# Patient Record
Sex: FEMALE | URBAN - METROPOLITAN AREA
[De-identification: names, ages, dates, MRNs, and addresses within clinical notes are randomized per-mention and may not be internally consistent; named-entity substitution may affect disease eponyms.]

---

## 2018-02-28 ENCOUNTER — INPATIENT (INPATIENT)
Facility: HOSPITAL | Age: 48
LOS: 2 days | Discharge: HOME HEALTH | End: 2018-03-03
Attending: INTERNAL MEDICINE | Admitting: INTERNAL MEDICINE
Payer: MEDICAID

## 2018-02-28 VITALS
HEART RATE: 110 BPM | SYSTOLIC BLOOD PRESSURE: 132 MMHG | TEMPERATURE: 98 F | RESPIRATION RATE: 18 BRPM | DIASTOLIC BLOOD PRESSURE: 92 MMHG | OXYGEN SATURATION: 100 %

## 2018-02-28 DIAGNOSIS — E11.9 TYPE 2 DIABETES MELLITUS WITHOUT COMPLICATIONS: ICD-10-CM

## 2018-02-28 DIAGNOSIS — I47.2 VENTRICULAR TACHYCARDIA: ICD-10-CM

## 2018-02-28 DIAGNOSIS — I10 ESSENTIAL (PRIMARY) HYPERTENSION: ICD-10-CM

## 2018-02-28 DIAGNOSIS — Z29.9 ENCOUNTER FOR PROPHYLACTIC MEASURES, UNSPECIFIED: ICD-10-CM

## 2018-02-28 LAB
ALBUMIN SERPL ELPH-MCNC: 3.3 G/DL — SIGNIFICANT CHANGE UP (ref 3.3–5)
ALBUMIN SERPL ELPH-MCNC: 3.7 G/DL — SIGNIFICANT CHANGE UP (ref 3.3–5)
ALP SERPL-CCNC: 56 U/L — SIGNIFICANT CHANGE UP (ref 40–120)
ALP SERPL-CCNC: 62 U/L — SIGNIFICANT CHANGE UP (ref 40–120)
ALT FLD-CCNC: 11 U/L — SIGNIFICANT CHANGE UP (ref 4–33)
ALT FLD-CCNC: 8 U/L — SIGNIFICANT CHANGE UP (ref 4–33)
APTT BLD: 28.7 SEC — SIGNIFICANT CHANGE UP (ref 27.5–37.4)
AST SERPL-CCNC: 14 U/L — SIGNIFICANT CHANGE UP (ref 4–32)
AST SERPL-CCNC: 17 U/L — SIGNIFICANT CHANGE UP (ref 4–32)
BASOPHILS # BLD AUTO: 0.03 K/UL — SIGNIFICANT CHANGE UP (ref 0–0.2)
BASOPHILS NFR BLD AUTO: 0.6 % — SIGNIFICANT CHANGE UP (ref 0–2)
BILIRUB SERPL-MCNC: 0.2 MG/DL — SIGNIFICANT CHANGE UP (ref 0.2–1.2)
BILIRUB SERPL-MCNC: < 0.2 MG/DL — LOW (ref 0.2–1.2)
BUN SERPL-MCNC: 10 MG/DL — SIGNIFICANT CHANGE UP (ref 7–23)
BUN SERPL-MCNC: 12 MG/DL — SIGNIFICANT CHANGE UP (ref 7–23)
CALCIUM SERPL-MCNC: 8.1 MG/DL — LOW (ref 8.4–10.5)
CALCIUM SERPL-MCNC: 8.7 MG/DL — SIGNIFICANT CHANGE UP (ref 8.4–10.5)
CHLORIDE SERPL-SCNC: 103 MMOL/L — SIGNIFICANT CHANGE UP (ref 98–107)
CHLORIDE SERPL-SCNC: 105 MMOL/L — SIGNIFICANT CHANGE UP (ref 98–107)
CK MB BLD-MCNC: 1.04 NG/ML — SIGNIFICANT CHANGE UP (ref 1–4.7)
CK MB BLD-MCNC: 1.34 NG/ML — SIGNIFICANT CHANGE UP (ref 1–4.7)
CK MB BLD-MCNC: SIGNIFICANT CHANGE UP (ref 0–2.5)
CK SERPL-CCNC: 100 U/L — SIGNIFICANT CHANGE UP (ref 25–170)
CK SERPL-CCNC: 84 U/L — SIGNIFICANT CHANGE UP (ref 25–170)
CO2 SERPL-SCNC: 24 MMOL/L — SIGNIFICANT CHANGE UP (ref 22–31)
CO2 SERPL-SCNC: 27 MMOL/L — SIGNIFICANT CHANGE UP (ref 22–31)
CREAT SERPL-MCNC: 0.81 MG/DL — SIGNIFICANT CHANGE UP (ref 0.5–1.3)
CREAT SERPL-MCNC: 0.88 MG/DL — SIGNIFICANT CHANGE UP (ref 0.5–1.3)
EOSINOPHIL # BLD AUTO: 0.03 K/UL — SIGNIFICANT CHANGE UP (ref 0–0.5)
EOSINOPHIL NFR BLD AUTO: 0.6 % — SIGNIFICANT CHANGE UP (ref 0–6)
GLUCOSE SERPL-MCNC: 134 MG/DL — HIGH (ref 70–99)
GLUCOSE SERPL-MCNC: 92 MG/DL — SIGNIFICANT CHANGE UP (ref 70–99)
HCT VFR BLD CALC: 30.7 % — LOW (ref 34.5–45)
HCT VFR BLD CALC: 34.6 % — SIGNIFICANT CHANGE UP (ref 34.5–45)
HGB BLD-MCNC: 10.6 G/DL — LOW (ref 11.5–15.5)
HGB BLD-MCNC: 9.9 G/DL — LOW (ref 11.5–15.5)
IMM GRANULOCYTES # BLD AUTO: 0.02 # — SIGNIFICANT CHANGE UP
IMM GRANULOCYTES NFR BLD AUTO: 0.4 % — SIGNIFICANT CHANGE UP (ref 0–1.5)
INR BLD: 1.03 — SIGNIFICANT CHANGE UP (ref 0.88–1.17)
LYMPHOCYTES # BLD AUTO: 1.53 K/UL — SIGNIFICANT CHANGE UP (ref 1–3.3)
LYMPHOCYTES # BLD AUTO: 30 % — SIGNIFICANT CHANGE UP (ref 13–44)
MAGNESIUM SERPL-MCNC: 2 MG/DL — SIGNIFICANT CHANGE UP (ref 1.6–2.6)
MAGNESIUM SERPL-MCNC: 2.1 MG/DL — SIGNIFICANT CHANGE UP (ref 1.6–2.6)
MAGNESIUM SERPL-MCNC: 2.2 MG/DL — SIGNIFICANT CHANGE UP (ref 1.6–2.6)
MCHC RBC-ENTMCNC: 26.4 PG — LOW (ref 27–34)
MCHC RBC-ENTMCNC: 28.4 PG — SIGNIFICANT CHANGE UP (ref 27–34)
MCHC RBC-ENTMCNC: 30.6 % — LOW (ref 32–36)
MCHC RBC-ENTMCNC: 32.2 % — SIGNIFICANT CHANGE UP (ref 32–36)
MCV RBC AUTO: 86.3 FL — SIGNIFICANT CHANGE UP (ref 80–100)
MCV RBC AUTO: 88 FL — SIGNIFICANT CHANGE UP (ref 80–100)
MONOCYTES # BLD AUTO: 0.3 K/UL — SIGNIFICANT CHANGE UP (ref 0–0.9)
MONOCYTES NFR BLD AUTO: 5.9 % — SIGNIFICANT CHANGE UP (ref 2–14)
NEUTROPHILS # BLD AUTO: 3.19 K/UL — SIGNIFICANT CHANGE UP (ref 1.8–7.4)
NEUTROPHILS NFR BLD AUTO: 62.5 % — SIGNIFICANT CHANGE UP (ref 43–77)
NRBC # FLD: 0 — SIGNIFICANT CHANGE UP
NRBC # FLD: 0 — SIGNIFICANT CHANGE UP
PHOSPHATE SERPL-MCNC: 2.1 MG/DL — LOW (ref 2.5–4.5)
PHOSPHATE SERPL-MCNC: 2.2 MG/DL — LOW (ref 2.5–4.5)
PHOSPHATE SERPL-MCNC: 3.9 MG/DL — SIGNIFICANT CHANGE UP (ref 2.5–4.5)
PLATELET # BLD AUTO: 244 K/UL — SIGNIFICANT CHANGE UP (ref 150–400)
PLATELET # BLD AUTO: 285 K/UL — SIGNIFICANT CHANGE UP (ref 150–400)
PMV BLD: 10.3 FL — SIGNIFICANT CHANGE UP (ref 7–13)
PMV BLD: 10.3 FL — SIGNIFICANT CHANGE UP (ref 7–13)
POTASSIUM SERPL-MCNC: 4.5 MMOL/L — SIGNIFICANT CHANGE UP (ref 3.5–5.3)
POTASSIUM SERPL-MCNC: 4.5 MMOL/L — SIGNIFICANT CHANGE UP (ref 3.5–5.3)
POTASSIUM SERPL-SCNC: 4.5 MMOL/L — SIGNIFICANT CHANGE UP (ref 3.5–5.3)
POTASSIUM SERPL-SCNC: 4.5 MMOL/L — SIGNIFICANT CHANGE UP (ref 3.5–5.3)
PROT SERPL-MCNC: 6.5 G/DL — SIGNIFICANT CHANGE UP (ref 6–8.3)
PROT SERPL-MCNC: 7.3 G/DL — SIGNIFICANT CHANGE UP (ref 6–8.3)
PROTHROM AB SERPL-ACNC: 11.8 SEC — SIGNIFICANT CHANGE UP (ref 9.8–13.1)
RBC # BLD: 3.49 M/UL — LOW (ref 3.8–5.2)
RBC # BLD: 4.01 M/UL — SIGNIFICANT CHANGE UP (ref 3.8–5.2)
RBC # FLD: 14.6 % — HIGH (ref 10.3–14.5)
RBC # FLD: 14.7 % — HIGH (ref 10.3–14.5)
SODIUM SERPL-SCNC: 139 MMOL/L — SIGNIFICANT CHANGE UP (ref 135–145)
SODIUM SERPL-SCNC: 140 MMOL/L — SIGNIFICANT CHANGE UP (ref 135–145)
T3 SERPL-MCNC: 95.8 NG/DL — SIGNIFICANT CHANGE UP (ref 80–200)
T3 SERPL-MCNC: 96.3 NG/DL — SIGNIFICANT CHANGE UP (ref 80–200)
T4 AB SER-ACNC: 5.89 UG/DL — SIGNIFICANT CHANGE UP (ref 5.1–13)
T4 AB SER-ACNC: 6.46 UG/DL — SIGNIFICANT CHANGE UP (ref 5.1–13)
TROPONIN T SERPL-MCNC: < 0.06 NG/ML — SIGNIFICANT CHANGE UP (ref 0–0.06)
TROPONIN T SERPL-MCNC: < 0.06 NG/ML — SIGNIFICANT CHANGE UP (ref 0–0.06)
TSH SERPL-MCNC: 0.86 UIU/ML — SIGNIFICANT CHANGE UP (ref 0.27–4.2)
TSH SERPL-MCNC: 0.89 UIU/ML — SIGNIFICANT CHANGE UP (ref 0.27–4.2)
WBC # BLD: 5.1 K/UL — SIGNIFICANT CHANGE UP (ref 3.8–10.5)
WBC # BLD: 5.21 K/UL — SIGNIFICANT CHANGE UP (ref 3.8–10.5)
WBC # FLD AUTO: 5.1 K/UL — SIGNIFICANT CHANGE UP (ref 3.8–10.5)
WBC # FLD AUTO: 5.21 K/UL — SIGNIFICANT CHANGE UP (ref 3.8–10.5)

## 2018-02-28 PROCEDURE — 71046 X-RAY EXAM CHEST 2 VIEWS: CPT | Mod: 26

## 2018-02-28 RX ORDER — VERAPAMIL HCL 240 MG
0.5 CAPSULE, EXTENDED RELEASE PELLETS 24 HR ORAL ONCE
Qty: 0 | Refills: 0 | Status: DISCONTINUED | OUTPATIENT
Start: 2018-02-28 | End: 2018-02-28

## 2018-02-28 RX ORDER — AMIODARONE HYDROCHLORIDE 400 MG/1
150 TABLET ORAL ONCE
Qty: 0 | Refills: 0 | Status: DISCONTINUED | OUTPATIENT
Start: 2018-02-28 | End: 2018-02-28

## 2018-02-28 RX ORDER — POTASSIUM PHOSPHATE, MONOBASIC POTASSIUM PHOSPHATE, DIBASIC 236; 224 MG/ML; MG/ML
15 INJECTION, SOLUTION INTRAVENOUS ONCE
Qty: 0 | Refills: 0 | Status: COMPLETED | OUTPATIENT
Start: 2018-02-28 | End: 2018-02-28

## 2018-02-28 RX ORDER — SODIUM CHLORIDE 9 MG/ML
1000 INJECTION INTRAMUSCULAR; INTRAVENOUS; SUBCUTANEOUS ONCE
Qty: 0 | Refills: 0 | Status: COMPLETED | OUTPATIENT
Start: 2018-02-28 | End: 2018-02-28

## 2018-02-28 RX ORDER — VERAPAMIL HCL 240 MG
1 CAPSULE, EXTENDED RELEASE PELLETS 24 HR ORAL ONCE
Qty: 0 | Refills: 0 | Status: COMPLETED | OUTPATIENT
Start: 2018-02-28 | End: 2018-02-28

## 2018-02-28 RX ORDER — METOPROLOL TARTRATE 50 MG
12.5 TABLET ORAL
Qty: 0 | Refills: 0 | Status: DISCONTINUED | OUTPATIENT
Start: 2018-02-28 | End: 2018-03-03

## 2018-02-28 RX ORDER — LIDOCAINE HCL 20 MG/ML
1 VIAL (ML) INJECTION
Qty: 2 | Refills: 0 | Status: DISCONTINUED | OUTPATIENT
Start: 2018-02-28 | End: 2018-03-01

## 2018-02-28 RX ORDER — CALCIUM ACETATE 667 MG
667 TABLET ORAL ONCE
Qty: 0 | Refills: 0 | Status: DISCONTINUED | OUTPATIENT
Start: 2018-02-28 | End: 2018-02-28

## 2018-02-28 RX ORDER — HEPARIN SODIUM 5000 [USP'U]/ML
5000 INJECTION INTRAVENOUS; SUBCUTANEOUS EVERY 8 HOURS
Qty: 0 | Refills: 0 | Status: DISCONTINUED | OUTPATIENT
Start: 2018-02-28 | End: 2018-03-03

## 2018-02-28 RX ORDER — LIDOCAINE HCL 20 MG/ML
100 VIAL (ML) INJECTION ONCE
Qty: 0 | Refills: 0 | Status: COMPLETED | OUTPATIENT
Start: 2018-02-28 | End: 2018-02-28

## 2018-02-28 RX ADMIN — Medication 100 MILLIGRAM(S): at 17:45

## 2018-02-28 RX ADMIN — SODIUM CHLORIDE 1000 MILLILITER(S): 9 INJECTION INTRAMUSCULAR; INTRAVENOUS; SUBCUTANEOUS at 16:51

## 2018-02-28 RX ADMIN — Medication 1 MILLIGRAM(S): at 16:34

## 2018-02-28 RX ADMIN — Medication 7.5 MG/MIN: at 20:00

## 2018-02-28 RX ADMIN — HEPARIN SODIUM 5000 UNIT(S): 5000 INJECTION INTRAVENOUS; SUBCUTANEOUS at 22:00

## 2018-02-28 RX ADMIN — Medication 12.5 MILLIGRAM(S): at 20:37

## 2018-02-28 RX ADMIN — POTASSIUM PHOSPHATE, MONOBASIC POTASSIUM PHOSPHATE, DIBASIC 62.5 MILLIMOLE(S): 236; 224 INJECTION, SOLUTION INTRAVENOUS at 16:24

## 2018-02-28 RX ADMIN — Medication 7.5 MG/MIN: at 20:01

## 2018-02-28 RX ADMIN — Medication 1 MILLIGRAM(S): at 16:39

## 2018-02-28 NOTE — ED PROVIDER NOTE - PROGRESS NOTE DETAILS
Stepan att: 15:31 EP consult placed. 15:41 EP Attg Lucho Thompson review 12 lead EKG. Suspect fasicular vtach, would do poorly with amio. Request stat echo eval EF, if EF good give verapamil ivp. Then admit for ablation. Stepan att: Sono team at bedside, nl EF. EP NP at bedside notified. Per d/w EP Att left sided fasciular sustained vtach, pls give Verapamil 0.5 mg ivp. EP NP to acquire CCU bed, admit, ablation. Stepan att: 15:55 Sono team at bedside, nl EF. EP NP at bedside notified. Per d/w EP Att left sided fasciular sustained vtach, pls give Verapamil 0.5 mg ivp. EP NP to acquire CCU bed, admit, ablation.

## 2018-02-28 NOTE — H&P ADULT - NSHPPHYSICALEXAM_GEN_ALL_CORE
ICU Vital Signs Last 24 Hrs  T(C): 37 (28 Feb 2018 18:30), Max: 37 (28 Feb 2018 18:30)  T(F): 98.6 (28 Feb 2018 18:30), Max: 98.6 (28 Feb 2018 18:30)  HR: 70 (28 Feb 2018 19:00) (68 - 110)  BP: 140/81 (28 Feb 2018 19:00) (101/64 - 140/81)  BP(mean): 94 (28 Feb 2018 19:00) (90 - 94)  ABP: --  ABP(mean): --  RR: 16 (28 Feb 2018 19:00) (16 - 19)  SpO2: 100% (28 Feb 2018 19:00) (100% - 100%)    PHYSICAL EXAM  GENERAL: NAD, well-developed  HEAD: NC/AT  Eyes: EOMI, PERRLA, conjunctiva and sclera clear  NECK: Supple, No JVD  CHEST/LUNG: CTAB, No wheeze  HEART: RRR, no m/r/g  ABDOMEN: Soft, NT/ND, +BS  EXTREMITIES: 2+ peripheral pulses, no clubbing, cyanosis, edema  NEURO: AO3, No focal deficits, CN II-XII intact  PSYCH: Appropriate mood and affect  SKIN: No rashes or lesions

## 2018-02-28 NOTE — H&P ADULT - NSHPREVIEWOFSYSTEMS_GEN_ALL_CORE
REVIEW OF SYSTEMS:    CONSTITUTIONAL: No fever, weight loss, chills, shakes, or fatigue  EYES: No eye pain, visual disturbances, or discharge  ENMT:  No difficulty hearing, tinnitus, vertigo; No sinus or throat pain  NECK: No pain or stiffness  RESPIRATORY: No cough, wheezing, hemoptysis, or shortness of breath  CARDIOVASCULAR: +chest pain, +dyspnea, +palpitations, +dizziness; No syncope, paroxysmal nocturnal dyspnea, orthopnea, or arm or leg swelling  GASTROINTESTINAL: No abdominal  or epigastric pain, nausea, vomiting, hematemesis, diarrhea, constipation, melena or bright red blood.  GENITOURINARY: No dysuria, nocturia, hematuria, or urinary incontinence  NEUROLOGICAL: No headaches, memory loss, slurred speech, limb weakness, loss of strength, numbness, or tremors  SKIN: No itching, burning, rashes, or lesions   LYMPH NODES: No enlarged glands  ENDOCRINE: No heat or cold intolerance, or hair loss  MUSCULOSKELETAL: No joint pain or swelling, muscle, back, or extremity pain  PSYCHIATRIC: No depression, anxiety, or difficulty sleeping  HEME/LYMPH: No easy bruising or bleeding gums  ALLERY AND IMMUNOLOGIC: No hives or rash.

## 2018-02-28 NOTE — H&P ADULT - HISTORY OF PRESENT ILLNESS
47 year old female with PMH of HTN, HLD and DM T2-not on meds presented to ED with acute onset of palpitations started last night.  She states the palpitations occurred while she was sitting and associated with chest pain/lightheadedness and shortness of breath.  The episodes lasted up to 10 minutes.  This morning she woke up with palpitations again and decided to call EMS.  She denies prior history of similar palpitations and denies syncope/near syncope or family hx of SCD.  EKG and telemetry strips demonstrated SR with intermittent frequent runs of sustained and non-sustained VT up to 15-20 seconds.  VT morphology suggests possible fascicular origin VT.  IV Verapamil 1 mg x2 administered without efficacy of terminating VT.  Lidocaine 100mg IV push ordered at present time.  An urgent bedside echocardiogram performed by ED staff which showed normal LV systolic function and without apparent structural heart disease.  She remained hemodynamically stable at present time.  First set cardiac enzymes were negative.

## 2018-02-28 NOTE — CONSULT NOTE ADULT - SUBJECTIVE AND OBJECTIVE BOX
Patient is a 47y old  Female who presents with a chief complaint of         HPI:    47 year old female with PMH of HTN, HLD and DM T2-not on meds presented to ED with acute onset of palpitations started last night.  She states the palpitations occurred while she was sitting and associated with chest pain/lightheadedness and shortness of breath.  The episodes lasted up to 10 minutes.  This morning she woke up with palpitations again and decided to call EMS.  She denies prior history of similar palpitations and denies syncope/near syncope or family hx of SCD.  EKG and telemetry strips demonstrated SR with intermittent frequent runs of sustained and non-sustained VT up to 15-20 seconds.  VT morphology suggests possible fascicular origin VT.  IV Verapamil 1 mg x2 administered without efficacy of terminating VT.  Lidocaine 100mg IV push ordered at present time.  An urgent bedside echocardiogram performed by ED staff which showed normal LV systolic function and without apparent structural heart disease.  She remained hemodynamically stable at present time.  First set cardiac enzymes were negative.      PAST MEDICAL & SURGICAL HISTORY:  HTN (hypertension)  DM (diabetes mellitus)  No significant past surgical history      MEDICATIONS  (STANDING): Home meds: none  lidocaine 2% Syringe 100 milliGRAM(s) IV Push Once    MEDICATIONS  (PRN):    Allergies    No Known Allergies    Intolerances      FAMILY HISTORY:  No pertinent family history in first degree relatives      SOCIAL HISTORY:  Denies smoking; no   Alcohol  or  Drug abuse     REVIEW OF SYSTEMS:    CONSTITUTIONAL: No fever, weight loss, chills, shakes, or fatigue  EYES: No eye pain, visual disturbances, or discharge  ENMT:  No difficulty hearing, tinnitus, vertigo; No sinus or throat pain  NECK: No pain or stiffness  RESPIRATORY: No cough, wheezing, hemoptysis, or shortness of breath  CARDIOVASCULAR: No syncope, paroxysmal nocturnal dyspnea, orthopnea, or arm or leg swelling +chest pain, dyspnea, palpitations, dizziness  GASTROINTESTINAL: No abdominal  or epigastric pain, nausea, vomiting, hematemesis, diarrhea, constipation, melena or bright red blood.  GENITOURINARY: No dysuria, nocturia, hematuria, or urinary incontinence  NEUROLOGICAL: No headaches, memory loss, slurred speech, limb weakness, loss of strength, numbness, or tremors  SKIN: No itching, burning, rashes, or lesions   LYMPH NODES: No enlarged glands  ENDOCRINE: No heat or cold intolerance, or hair loss  MUSCULOSKELETAL: No joint pain or swelling, muscle, back, or extremity pain  PSYCHIATRIC: No depression, anxiety, or difficulty sleeping  HEME/LYMPH: No easy bruising or bleeding gums  ALLERY AND IMMUNOLOGIC: No hives or rash.      Vital Signs Last 24 Hrs  T(C): 36.7 (28 Feb 2018 14:39), Max: 36.8 (28 Feb 2018 13:55)  T(F): 98.1 (28 Feb 2018 14:39), Max: 98.3 (28 Feb 2018 13:55)  HR: 72 (28 Feb 2018 16:49) (72 - 110)  BP: 101/64 (28 Feb 2018 16:49) (101/64 - 132/92)  BP(mean): --  RR: 18 (28 Feb 2018 16:49) (16 - 18)  SpO2: 100% (28 Feb 2018 16:49) (100% - 100%)    PHYSICAL EXAM:    GENERAL: In no apparent distress, well nourished, and hydrated.  HEAD:  Atraumatic, Normocephalic  NECK: Supple . No JVD or carotid bruit or thyroidmegaly.  Carotid pulse is 2+ bilaterally.  PULMONARY: Clear to auscultation and perfusion.  No rales, wheezing, or rhonchi bilaterally.  HEART: Regular rate and rhythm; No murmurs, rubs, or gallops.  ABDOMEN: Soft, Nontender, Nondistended; Bowel sounds present  EXTREMITIES: No clubbing, cyanosis, or edema  NEUROLOGICAL: Alert oriented to person, place and time.  Speech clear.  Skin: Dry intact, no rashes or lesions.          INTERPRETATION OF TELEMETRY:  Sinus rhythm with frequent runs VT at 110's bpm    ECG: SR with PVC's runs NSVT        LABS:                        10.6   5.10  )-----------( 285      ( 28 Feb 2018 14:26 )             34.6     02-28    139  |  103  |  10  ----------------------------<  92  4.5   |  27  |  0.88    Ca    8.7      28 Feb 2018 14:26  Phos  2.2     02-28  Mg     2.1     02-28    TPro  7.3  /  Alb  3.7  /  TBili  0.2  /  DBili  x   /  AST  17  /  ALT  11  /  AlkPhos  62  02-28    CARDIAC MARKERS ( 28 Feb 2018 14:26 )  x     / < 0.06 ng/mL / 100 u/L / 1.34 ng/mL / x          PT/INR - ( 28 Feb 2018 14:26 )   PT: 11.8 SEC;   INR: 1.03          PTT - ( 28 Feb 2018 14:26 )  PTT:28.7 SEC      BNP  RADIOLOGY & ADDITIONAL STUDIES: CXR 2/28:  Underinflated but otherwise clear lungs. No pleural effusions or   pneumothorax.    Cardiac and mediastinal silhouettes within normal limits.    Trachea midline.    Unremarkable osseous structures.          ECHO FINDINGS:    STRESS FINDINGS:    CATHETERIZATION FINDINGS:

## 2018-02-28 NOTE — ED ADULT NURSE NOTE - OBJECTIVE STATEMENT
Pt a&ox3 c/o rt sided chest pain since last night. Pt breathing even and unlabored, denies sob. Pt denies any present cp/discomfort, denies headache/dizziness. Abdomen soft, non-tender, non-distended. Skin is cool dry and intact. IVL placed, labs sent. Will continue to monitor.

## 2018-02-28 NOTE — ED ADULT NURSE NOTE - CHIEF COMPLAINT QUOTE
Arrives via EMS "I'm having my heart beat, since 8 O'Clock last night"  B  mg, and NTG intranasally without relief.  As per EMS a run of V tach every few minutes.   Mg/dl

## 2018-02-28 NOTE — ED ADULT TRIAGE NOTE - CHIEF COMPLAINT QUOTE
Arrives via EMS "I'm having my heart beat, since 8 O'Clock last night"  B  mg.  As per EMS a run of V tach every few minutes.   Mg/dl Arrives via EMS "I'm having my heart beat, since 8 O'Clock last night"  B  mg, and NTG intranasally without relief.  As per EMS a run of V tach every few minutes.   Mg/dl

## 2018-02-28 NOTE — ED PROVIDER NOTE - PHYSICAL EXAMINATION
Mariela Tineo M.D.:   patient awake alert NAD .   LUNGS CTAB no wheeze no crackle.   CARD occassional irregular beat. no m/r/g.    Abdomen soft NT ND no rebound no guarding no CVA tenderness.   EXT WWP no edema no calf tenderness CV 2+DP/PT bilaterally.   neuro A&Ox3 gait normal.    skin warm and dry no rash  HEENT: moist mucous membranes, PERRL, EOMI

## 2018-02-28 NOTE — H&P ADULT - ASSESSMENT
47 year old female with PMH of HTN, HLD and DM T2-not on meds presented to ED with acute onset of palpitations found to have paroxysmal VT(both sustained and non-sustained) since last night.  VT morphology suggest left fascicular posterior VT origin.  Patient is highly symptomatic in VT, therefore EP study with RF ablation is recommended.

## 2018-02-28 NOTE — H&P ADULT - PROBLEM SELECTOR PLAN 1
Per EP: Likely papillary mediated VT  - Monitor off antiarrhythmics. Can give bolus Lidocaine 2% 100mg IV over 3-5 minutes per Dr. Thompson if patient in VT.  - Avoid Amiodarone for now.  May administer BB if VT recur after Lidocaine  - Echocardiogram; cardiac catheterization tomorrow to r/u CAD as VT etiology  - CMR (cardiac MR) to check for infiltrative cardiac disease prior to RF ablation     - Keep K>4.0 and Mg>1.8  - plan for ablation likely Friday 3/2, following CMR and C

## 2018-02-28 NOTE — CONSULT NOTE ADULT - ASSESSMENT
47 year old female with PMH of HTN, HLD and DM T2-not on meds presented to ED with acute onset of palpitations found to have paroxysmal VT(both sustained and non-sustained) since last night.  VT morphology suggest left fascicular posterior VT origin.  Patient is highly symptomatic in VT, therefore EP study with RF ablation is recommended.    -Admit to CCU   -Lidocaine 2% 100mg IV over 3-5 minutes per Dr. Thompson, would avoid Amiodarone for now.  May administer BB if VT recur after Lidocaine  -Echocardiogram; cardiac catheterization tomorrow to r/u CAD as VT etiology  -CMR (cardiac MR) to check for infiltrative cardiac disease prior to RF ablation     -Keep K>4.0 and Mg>1.8   -Plan discussed with ED staff

## 2018-02-28 NOTE — ED PROCEDURE NOTE - PROCEDURE ADDITIONAL DETAILS
Focused ED Transthoracic echo 00520 - indication ( arrhythmia     )    Grey scale imaging obtained in four views ( parasternal long, parasternal short, apical and subxiphoid)    No pericardial effusion noted.  No evidence of cardiac tamponade  LV contractility - normal.  RV normal size.    Impression: no pericardial effusion, normal contractility, RV normal size.  no MR or AI.  Dexeus.

## 2018-02-28 NOTE — ED PROVIDER NOTE - MEDICAL DECISION MAKING DETAILS
47F hx htn hld dm p/w intermittent palpitations/cp/sob since yesterday. EKG show frequent PVCs. concern for electrolyte disturbance vs thyroid vs cardiac etiology. for labs, cxr, cardiacs, admission vs cdu for further workup.

## 2018-02-28 NOTE — H&P ADULT - NSHPLABSRESULTS_GEN_ALL_CORE
10.6   5.10  )-----------( 285      ( 28 Feb 2018 14:26 )             34.6       02-28    139  |  103  |  10  ----------------------------<  92  4.5   |  27  |  0.88    Ca    8.7      28 Feb 2018 14:26  Phos  2.2     02-28  Mg     2.1     02-28    TPro  7.3  /  Alb  3.7  /  TBili  0.2  /  DBili  x   /  AST  17  /  ALT  11  /  AlkPhos  62  02-28                  PT/INR - ( 28 Feb 2018 14:26 )   PT: 11.8 SEC;   INR: 1.03          PTT - ( 28 Feb 2018 14:26 )  PTT:28.7 SEC    Lactate Trend      CARDIAC MARKERS ( 28 Feb 2018 14:26 )  x     / < 0.06 ng/mL / 100 u/L / 1.34 ng/mL / x            CAPILLARY BLOOD GLUCOSE      < from: Xray Chest 2 Views PA/Lat (02.28.18 @ 15:16) >    IMPRESSION:  Underinflated but otherwise clear lungs. No pleural effusions or   pneumothorax.    Cardiac and mediastinal silhouettes within normal limits.    Trachea midline.    Unremarkable osseous structures.    < end of copied text >    Bedside Echo in ED:  No pericardial effusion noted.  No evidence of cardiac tamponade  LV contractility - normal.  RV normal size.    Impression: no pericardial effusion, normal contractility, RV normal size.  no MR or AI.  Dexeus.

## 2018-02-28 NOTE — ED PROVIDER NOTE - OBJECTIVE STATEMENT
47 [facility] female BIBEMS for CP, palpitations, SOB. symptoms began yesterday evening while laying in bed. symptoms are intermittent. CP is substernal throbbing, nonradiating, associated with the palpitations. never had symptoms like this before. hx of DM htn hld. nonsmoker. no fam hx heart disease. given  and NTG by EMS w/o relief. 47 [facility] female BIBEMS for CP, palpitations, SOB. symptoms began yesterday evening while laying in bed. symptoms are intermittent. CP is substernal throbbing, nonradiating, associated with the palpitations. never had symptoms like this before. hx of DM htn hld. nonsmoker. no fam hx heart disease. given  and NTG by EMS w/o relief.    15:10 Ying att: 47F BIBEMS CP and intermitt vtach. Pt rec'd asa 162 mg po and nitro SL x 1 from EMS prior to arrival. Patient reports past 2 days intermitt cp, midsternal and right sided, non-rad, non-exertional, + palpitation + sob. During exam patient has frequent intermitt 10-15 second runs of vtach during which patient notes cp. Denies pre-existing cardiac disease.

## 2018-02-28 NOTE — ED PROVIDER NOTE - ATTENDING CONTRIBUTION TO CARE
Dr. Ying: I have personally seen and examined this patient at the bedside. I have fully participated in the care of this patient. I have reviewed all pertinent clinical information, including history, physical exam, plan and the Resident's note and agree except as noted. HPI above as by me. PE above as by me. DDX stable vtach PLAN labs incl mag phos tsh, ep consult DISPO admit to ep after medical optimization.

## 2018-03-01 ENCOUNTER — OUTPATIENT (OUTPATIENT)
Dept: OUTPATIENT SERVICES | Facility: HOSPITAL | Age: 48
LOS: 1 days | End: 2018-03-01
Payer: MEDICAID

## 2018-03-01 DIAGNOSIS — R69 ILLNESS, UNSPECIFIED: ICD-10-CM

## 2018-03-01 LAB
ALBUMIN SERPL ELPH-MCNC: 3.3 G/DL — SIGNIFICANT CHANGE UP (ref 3.3–5)
ALP SERPL-CCNC: 60 U/L — SIGNIFICANT CHANGE UP (ref 40–120)
ALT FLD-CCNC: 10 U/L — SIGNIFICANT CHANGE UP (ref 4–33)
AST SERPL-CCNC: 12 U/L — SIGNIFICANT CHANGE UP (ref 4–32)
BILIRUB SERPL-MCNC: < 0.2 MG/DL — LOW (ref 0.2–1.2)
BUN SERPL-MCNC: 15 MG/DL — SIGNIFICANT CHANGE UP (ref 7–23)
CALCIUM SERPL-MCNC: 8.2 MG/DL — LOW (ref 8.4–10.5)
CHLORIDE SERPL-SCNC: 105 MMOL/L — SIGNIFICANT CHANGE UP (ref 98–107)
CHOLEST SERPL-MCNC: 189 MG/DL — SIGNIFICANT CHANGE UP (ref 120–199)
CO2 SERPL-SCNC: 24 MMOL/L — SIGNIFICANT CHANGE UP (ref 22–31)
CREAT SERPL-MCNC: 0.74 MG/DL — SIGNIFICANT CHANGE UP (ref 0.5–1.3)
GLUCOSE SERPL-MCNC: 106 MG/DL — HIGH (ref 70–99)
HBA1C BLD-MCNC: 6.2 % — HIGH (ref 4–5.6)
HCG UR-SCNC: NEGATIVE — SIGNIFICANT CHANGE UP
HCT VFR BLD CALC: 32.5 % — LOW (ref 34.5–45)
HDLC SERPL-MCNC: 33 MG/DL — LOW (ref 45–65)
HGB BLD-MCNC: 10 G/DL — LOW (ref 11.5–15.5)
LIPID PNL WITH DIRECT LDL SERPL: 130 MG/DL — SIGNIFICANT CHANGE UP
MAGNESIUM SERPL-MCNC: 2.1 MG/DL — SIGNIFICANT CHANGE UP (ref 1.6–2.6)
MCHC RBC-ENTMCNC: 26.8 PG — LOW (ref 27–34)
MCHC RBC-ENTMCNC: 30.8 % — LOW (ref 32–36)
MCV RBC AUTO: 87.1 FL — SIGNIFICANT CHANGE UP (ref 80–100)
NRBC # FLD: 0 — SIGNIFICANT CHANGE UP
PHOSPHATE SERPL-MCNC: 3.4 MG/DL — SIGNIFICANT CHANGE UP (ref 2.5–4.5)
PLATELET # BLD AUTO: 233 K/UL — SIGNIFICANT CHANGE UP (ref 150–400)
PMV BLD: 10.5 FL — SIGNIFICANT CHANGE UP (ref 7–13)
POTASSIUM SERPL-MCNC: 4.3 MMOL/L — SIGNIFICANT CHANGE UP (ref 3.5–5.3)
POTASSIUM SERPL-SCNC: 4.3 MMOL/L — SIGNIFICANT CHANGE UP (ref 3.5–5.3)
PROT SERPL-MCNC: 6.6 G/DL — SIGNIFICANT CHANGE UP (ref 6–8.3)
RBC # BLD: 3.73 M/UL — LOW (ref 3.8–5.2)
RBC # FLD: 14.9 % — HIGH (ref 10.3–14.5)
SODIUM SERPL-SCNC: 140 MMOL/L — SIGNIFICANT CHANGE UP (ref 135–145)
SP GR UR: 1.02 — SIGNIFICANT CHANGE UP (ref 1–1.03)
TRIGL SERPL-MCNC: 244 MG/DL — HIGH (ref 10–149)
WBC # BLD: 4.89 K/UL — SIGNIFICANT CHANGE UP (ref 3.8–10.5)
WBC # FLD AUTO: 4.89 K/UL — SIGNIFICANT CHANGE UP (ref 3.8–10.5)

## 2018-03-01 PROCEDURE — G9001: CPT

## 2018-03-01 PROCEDURE — 99233 SBSQ HOSP IP/OBS HIGH 50: CPT

## 2018-03-01 PROCEDURE — 99152 MOD SED SAME PHYS/QHP 5/>YRS: CPT

## 2018-03-01 PROCEDURE — 93458 L HRT ARTERY/VENTRICLE ANGIO: CPT | Mod: 26,GC

## 2018-03-01 PROCEDURE — 75561 CARDIAC MRI FOR MORPH W/DYE: CPT | Mod: 26

## 2018-03-01 RX ORDER — CHLORHEXIDINE GLUCONATE 213 G/1000ML
1 SOLUTION TOPICAL DAILY
Qty: 0 | Refills: 0 | Status: DISCONTINUED | OUTPATIENT
Start: 2018-03-01 | End: 2018-03-03

## 2018-03-01 RX ADMIN — Medication 12.5 MILLIGRAM(S): at 19:09

## 2018-03-01 RX ADMIN — CHLORHEXIDINE GLUCONATE 1 APPLICATION(S): 213 SOLUTION TOPICAL at 13:00

## 2018-03-01 RX ADMIN — HEPARIN SODIUM 5000 UNIT(S): 5000 INJECTION INTRAVENOUS; SUBCUTANEOUS at 05:50

## 2018-03-01 RX ADMIN — HEPARIN SODIUM 5000 UNIT(S): 5000 INJECTION INTRAVENOUS; SUBCUTANEOUS at 19:09

## 2018-03-01 RX ADMIN — HEPARIN SODIUM 5000 UNIT(S): 5000 INJECTION INTRAVENOUS; SUBCUTANEOUS at 22:56

## 2018-03-01 RX ADMIN — Medication 12.5 MILLIGRAM(S): at 05:49

## 2018-03-01 NOTE — PROGRESS NOTE ADULT - SUBJECTIVE AND OBJECTIVE BOX
S/ Events: Patient seen and examined. Denies CP, SOB, dizziness, LH, near syncope or synope. Feels well this am.   Verbalized concern as she is nervous if VT recurs.   No acute events overnight. VT much decreased since on Lidocaine.   Telemetry : - Sinus rhythm 70-80's with brief NSVT 4-5 beats.   O:  T(C): 36.8 (18 @ 07:47), Max: 37 (18 @ 18:30)  HR: 97 (18 @ 08:00) (58 - 110)  BP: 135/98 (18 @ 08:00) (96/57 - 140/81)  RR: 23 (18 @ 08:00) (11 - 23)  SpO2: 100% (18 @ 08:00) (98% - 100%)  Wt(kg): --  Daily Height in cm: 165.1 (2018 18:30)    Daily Weight in k (2018 18:30)  Gen: NAD  HEENT: EOMI  CV: RRR, normal S1 + S2, no m/r/g  Lungs: CTAB  Abd: soft, non-tender  Ext: No edema    Labs:                        10.0   4.89  )-----------( 233      ( 01 Mar 2018 05:15 )             32.5     03-    140  |  105  |  15  ----------------------------<  106<H>  4.3   |  24  |  0.74    Ca    8.2<L>      01 Mar 2018 05:15  Phos  3.4     03-  Mg     2.1     -    TPro  6.6  /  Alb  3.3  /  TBili  < 0.2<L>  /  DBili  x   /  AST  12  /  ALT  10  /  AlkPhos  60  03-    PT/INR - ( 2018 14:26 )   PT: 11.8 SEC;   INR: 1.03          PTT - ( 2018 14:26 )  PTT:28.7 SEC  CARDIAC MARKERS ( 2018 21:41 )  x     / < 0.06 ng/mL / 84 u/L / 1.04 ng/mL / x      CARDIAC MARKERS ( 2018 14:26 )  x     / < 0.06 ng/mL / 100 u/L / 1.34 ng/mL / x           DIagnostics :   12 Lead ECG:   Ventricular Rate 102 BPM    Atrial Rate 75 BPM    P-R Interval 134 ms    QRS Duration 174 ms    Q-T Interval 376 ms    QTC Calculation(Bezet) 490 ms    R Axis 36 degrees    T Axis 175 degrees    Diagnosis Line Sinus rhythm with frequent , and consecutive Premature ventricular complexes and Fusion complexes  Non-specific intra-ventricular conduction block  Anterolateral infarct , age undetermined  Abnormal ECG (18 @ 16:50)  12 Lead ECG:   Ventricular Rate 75 BPM    Atrial Rate 75 BPM    P-R Interval 162 ms    QRS Duration 66 ms    Q-T Interval 392 ms    QTC Calculation(Bezet) 437 ms    P Axis 38 degrees    R Axis 24 degrees    T Axis 20 degrees    Diagnosis Line Sinus rhythm with occasional Premature ventricular complexes  Minimal voltage criteria for LVH, may be normal variant  Nonspecific T wave abnormality  Abnormal ECG (18 @ 15:46)  12 Lead ECG:   Ventricular Rate 84 BPM    Atrial Rate 72 BPM    P-R Interval 158 ms    QRS Duration 68 ms    Q-T Interval 366 ms    QTC Calculation(Bezet) 432 ms    P Axis 42 degrees    R Axis 27 degrees    T Axis -12 degrees    Diagnosis Line Sinus rhythm with frequent , and consecutive Premature ventricular complexes and Fusion complexes  Minimal voltage criteria for LVH, may be normal variant  Nonspecific T wave abnormality  Abnormal ECG (18 @ 14:10)      Meds:  MEDICATIONS  (STANDING):  heparin  Injectable 5000 Unit(s) SubCutaneous every 8 hours  lidocaine   Infusion 1 mG/Min (7.5 mL/Hr) IV Continuous <Continuous>  metoprolol     tartrate 12.5 milliGRAM(s) Oral two times a day      A/P: In summary, Ms. Sainthilarie is a 47 year old female with PMH of HTN, HLD and DM T2-not on meds presented to ED with acute onset of palpitations found to have paroxysmal VT(both sustained and non-sustained at the time of admission now suppressed since on Lidocaine gtt . Morphology suggests left fascicular posterior VT origin.    Patient is very symptomatic in VT, Plan for VT ablation 3/2/18. VT remains suppressed on lidocaine.    Continue Lidocaine gtt    May administer BB if VT recurs.   -Echocardiogram - P   - cardiac catheterization tomorrow to r/u CAD as VT etiology  -CMR (cardiac MR) to check for infiltrative cardiac disease prior to RF ablation     -Keep K>4.0 and Mg>1.8   -NPO after MN for EPS and possible ablation.     Thank you.   Elisabet Thornton, FNP-C   [id number]

## 2018-03-01 NOTE — CHART NOTE - NSCHARTNOTEFT_GEN_A_CORE
R radial band removed with good hemostasis noted, no evidence of hematoma. DSD in place, hand warm with cap refill < 3 sec, radial pulse 2+ palp.

## 2018-03-01 NOTE — PROGRESS NOTE ADULT - PROBLEM SELECTOR PLAN 1
Per EP: Likely papillary mediated VT  - Monitor off antiarrhythmics. Can give bolus Lidocaine 2% 100mg IV over 3-5 minutes per Dr. Thompson if patient in VT.  - Avoid Amiodarone for now.  May administer BB if VT recur after Lidocaine  - Echocardiogram; cardiac catheterization tomorrow to r/u CAD as VT etiology  - CMR (cardiac MR) to check for infiltrative cardiac disease prior to RF ablation     - Keep K>4.0 and Mg>1.8  - plan for ablation likely Friday 3/2, following TTE, CMR and C

## 2018-03-01 NOTE — PROGRESS NOTE ADULT - PROBLEM SELECTOR PLAN 2
- Diet controlled.  - Serum glucose on BMP wnl - Diet controlled.  - Serum glucose on BMP wnl  - A1c 6.2

## 2018-03-01 NOTE — PROGRESS NOTE ADULT - SUBJECTIVE AND OBJECTIVE BOX
Toan Grajeda MD, Internal Medicine, PGY1  Pager - [facility]: [phone] ; [facility]: [phone]    Patient is a 47y old  Female who presents with a chief complaint of Palpitations (28 Feb 2018 19:17)        SUBJECTIVE / OVERNIGHT EVENTS: NAEON, no events on tele. Patient w/o complaints. Patient scheduled for Cardiac MRI and LHC today.      MEDICATIONS  (STANDING):  heparin  Injectable 5000 Unit(s) SubCutaneous every 8 hours  lidocaine   Infusion 1 mG/Min (7.5 mL/Hr) IV Continuous <Continuous>  metoprolol     tartrate 12.5 milliGRAM(s) Oral two times a day    MEDICATIONS  (PRN):      T(C): 36.8 (03-01-18 @ 07:47), Max: 37 (02-28-18 @ 18:30)  T(F): 98.2 (03-01-18 @ 07:47), Max: 98.6 (02-28-18 @ 18:30)  HR: 97 (03-01-18 @ 08:00) (58 - 110)  BP: 135/98 (03-01-18 @ 08:00) (96/57 - 140/81)  ABP: --  ABP(mean): --  RR: 23 (03-01-18 @ 08:00) (11 - 23)  SpO2: 100% (03-01-18 @ 08:00) (98% - 100%)    CAPILLARY BLOOD GLUCOSE        I&O's Summary    28 Feb 2018 07:01  -  01 Mar 2018 07:00  --------------------------------------------------------  IN: 1300 mL / OUT: 1750 mL / NET: -450 mL        PHYSICAL EXAM  GENERAL: NAD, well-developed  HEAD:  Atraumatic, Normocephalic  EYES: EOMI, PERRLA, conjunctiva and sclera clear  NECK: Supple, No JVD  CHEST/LUNG: Clear to auscultation bilaterally; No wheeze  HEART: Regular rate and rhythm; No murmurs, rubs, or gallops  ABDOMEN: Soft, Nontender, Nondistended; Bowel sounds present  EXTREMITIES:  2+ Peripheral Pulses, No clubbing, cyanosis, or edema  NEURO:  AOx3, No focal deficits  PSYCH: Appropriate mood and affect  SKIN: No rashes or lesions    LABS:                        10.0   4.89  )-----------( 233      ( 01 Mar 2018 05:15 )             32.5     03-01    140  |  105  |  15  ----------------------------<  106<H>  4.3   |  24  |  0.74    Ca    8.2<L>      01 Mar 2018 05:15  Phos  3.4     03-01  Mg     2.1     03-01    TPro  6.6  /  Alb  3.3  /  TBili  < 0.2<L>  /  DBili  x   /  AST  12  /  ALT  10  /  AlkPhos  60  03-01    PT/INR - ( 28 Feb 2018 14:26 )   PT: 11.8 SEC;   INR: 1.03          PTT - ( 28 Feb 2018 14:26 )  PTT:28.7 SEC  CARDIAC MARKERS ( 28 Feb 2018 21:41 )  x     / < 0.06 ng/mL / 84 u/L / 1.04 ng/mL / x      CARDIAC MARKERS ( 28 Feb 2018 14:26 )  x     / < 0.06 ng/mL / 100 u/L / 1.34 ng/mL / x              RADIOLOGY & ADDITIONAL TESTS:    Imaging Personally Reviewed:  Consultant(s) Notes Reviewed:    Care Discussed with Consultants/Other Providers:

## 2018-03-02 PROBLEM — I10 ESSENTIAL (PRIMARY) HYPERTENSION: Chronic | Status: ACTIVE | Noted: 2018-02-28

## 2018-03-02 PROBLEM — Z00.00 ENCOUNTER FOR PREVENTIVE HEALTH EXAMINATION: Status: ACTIVE | Noted: 2018-03-02

## 2018-03-02 PROBLEM — E11.9 TYPE 2 DIABETES MELLITUS WITHOUT COMPLICATIONS: Chronic | Status: ACTIVE | Noted: 2018-02-28

## 2018-03-02 LAB
ALBUMIN SERPL ELPH-MCNC: 3.5 G/DL — SIGNIFICANT CHANGE UP (ref 3.3–5)
ALP SERPL-CCNC: 59 U/L — SIGNIFICANT CHANGE UP (ref 40–120)
ALT FLD-CCNC: 8 U/L — SIGNIFICANT CHANGE UP (ref 4–33)
AST SERPL-CCNC: 14 U/L — SIGNIFICANT CHANGE UP (ref 4–32)
BASOPHILS # BLD AUTO: 0.02 K/UL — SIGNIFICANT CHANGE UP (ref 0–0.2)
BASOPHILS NFR BLD AUTO: 0.4 % — SIGNIFICANT CHANGE UP (ref 0–2)
BILIRUB SERPL-MCNC: 0.2 MG/DL — SIGNIFICANT CHANGE UP (ref 0.2–1.2)
BUN SERPL-MCNC: 13 MG/DL — SIGNIFICANT CHANGE UP (ref 7–23)
CALCIUM SERPL-MCNC: 8.4 MG/DL — SIGNIFICANT CHANGE UP (ref 8.4–10.5)
CHLORIDE SERPL-SCNC: 104 MMOL/L — SIGNIFICANT CHANGE UP (ref 98–107)
CO2 SERPL-SCNC: 22 MMOL/L — SIGNIFICANT CHANGE UP (ref 22–31)
CREAT SERPL-MCNC: 0.67 MG/DL — SIGNIFICANT CHANGE UP (ref 0.5–1.3)
EOSINOPHIL # BLD AUTO: 0.02 K/UL — SIGNIFICANT CHANGE UP (ref 0–0.5)
EOSINOPHIL NFR BLD AUTO: 0.4 % — SIGNIFICANT CHANGE UP (ref 0–6)
GLUCOSE BLDC GLUCOMTR-MCNC: 86 MG/DL — SIGNIFICANT CHANGE UP (ref 70–99)
GLUCOSE SERPL-MCNC: 109 MG/DL — HIGH (ref 70–99)
HCT VFR BLD CALC: 32.6 % — LOW (ref 34.5–45)
HGB BLD-MCNC: 10 G/DL — LOW (ref 11.5–15.5)
IMM GRANULOCYTES # BLD AUTO: 0.02 # — SIGNIFICANT CHANGE UP
IMM GRANULOCYTES NFR BLD AUTO: 0.4 % — SIGNIFICANT CHANGE UP (ref 0–1.5)
LYMPHOCYTES # BLD AUTO: 1.73 K/UL — SIGNIFICANT CHANGE UP (ref 1–3.3)
LYMPHOCYTES # BLD AUTO: 36.3 % — SIGNIFICANT CHANGE UP (ref 13–44)
MAGNESIUM SERPL-MCNC: 2.3 MG/DL — SIGNIFICANT CHANGE UP (ref 1.6–2.6)
MCHC RBC-ENTMCNC: 26.3 PG — LOW (ref 27–34)
MCHC RBC-ENTMCNC: 30.7 % — LOW (ref 32–36)
MCV RBC AUTO: 85.8 FL — SIGNIFICANT CHANGE UP (ref 80–100)
MONOCYTES # BLD AUTO: 0.29 K/UL — SIGNIFICANT CHANGE UP (ref 0–0.9)
MONOCYTES NFR BLD AUTO: 6.1 % — SIGNIFICANT CHANGE UP (ref 2–14)
NEUTROPHILS # BLD AUTO: 2.68 K/UL — SIGNIFICANT CHANGE UP (ref 1.8–7.4)
NEUTROPHILS NFR BLD AUTO: 56.4 % — SIGNIFICANT CHANGE UP (ref 43–77)
NRBC # FLD: 0 — SIGNIFICANT CHANGE UP
PHOSPHATE SERPL-MCNC: 3.7 MG/DL — SIGNIFICANT CHANGE UP (ref 2.5–4.5)
PLATELET # BLD AUTO: 247 K/UL — SIGNIFICANT CHANGE UP (ref 150–400)
PMV BLD: 10.5 FL — SIGNIFICANT CHANGE UP (ref 7–13)
POTASSIUM SERPL-MCNC: 4.2 MMOL/L — SIGNIFICANT CHANGE UP (ref 3.5–5.3)
POTASSIUM SERPL-SCNC: 4.2 MMOL/L — SIGNIFICANT CHANGE UP (ref 3.5–5.3)
PROT SERPL-MCNC: 6.7 G/DL — SIGNIFICANT CHANGE UP (ref 6–8.3)
RBC # BLD: 3.8 M/UL — SIGNIFICANT CHANGE UP (ref 3.8–5.2)
RBC # FLD: 14.5 % — SIGNIFICANT CHANGE UP (ref 10.3–14.5)
SODIUM SERPL-SCNC: 140 MMOL/L — SIGNIFICANT CHANGE UP (ref 135–145)
WBC # BLD: 4.76 K/UL — SIGNIFICANT CHANGE UP (ref 3.8–10.5)
WBC # FLD AUTO: 4.76 K/UL — SIGNIFICANT CHANGE UP (ref 3.8–10.5)

## 2018-03-02 PROCEDURE — 93662 INTRACARDIAC ECG (ICE): CPT | Mod: 26

## 2018-03-02 PROCEDURE — 93654 COMPRE EP EVAL TX VT: CPT

## 2018-03-02 PROCEDURE — 99233 SBSQ HOSP IP/OBS HIGH 50: CPT

## 2018-03-02 PROCEDURE — 93306 TTE W/DOPPLER COMPLETE: CPT | Mod: 26

## 2018-03-02 RX ADMIN — CHLORHEXIDINE GLUCONATE 1 APPLICATION(S): 213 SOLUTION TOPICAL at 12:00

## 2018-03-02 RX ADMIN — Medication 12.5 MILLIGRAM(S): at 23:50

## 2018-03-02 RX ADMIN — HEPARIN SODIUM 5000 UNIT(S): 5000 INJECTION INTRAVENOUS; SUBCUTANEOUS at 05:17

## 2018-03-02 RX ADMIN — Medication 12.5 MILLIGRAM(S): at 05:17

## 2018-03-02 NOTE — PROGRESS NOTE ADULT - SUBJECTIVE AND OBJECTIVE BOX
PATIENT: SAINTHILAIRE, JUDITH   AGE: 46yo   GENDER: Female  ALLERGIES: No Known Allergies    CHIEF COMPLAINT:   Palpitations (28 Feb 2018 19:17)    INTERVAL EVENTS:         MEDICATIONS  chlorhexidine 4% Liquid 1 Application(s) Topical daily  heparin  Injectable 5000 Unit(s) SubCutaneous every 8 hours  metoprolol     tartrate 12.5 milliGRAM(s) Oral two times a day      VITAL SIGNS (last 24 hrs):  ICU Vital Signs Last 24 Hrs  T(C): 36.7 (02 Mar 2018 08:00), Max: 36.8 (02 Mar 2018 00:00)  T(F): 98 (02 Mar 2018 08:00), Max: 98.2 (02 Mar 2018 00:00)  HR: 78 (02 Mar 2018 08:00) (60 - 101)  BP: 107/71 (02 Mar 2018 08:00) (94/54 - 145/82)  BP(mean): 80 (02 Mar 2018 08:00) (63 - 96)  ABP: --  ABP(mean): --  RR: 21 (02 Mar 2018 08:00) (13 - 24)  SpO2: 100% (02 Mar 2018 08:00) (96% - 100%)      PHYSICAL EXAM:  GENERAL: NAD  HEENT:  AT/NC; EOMI, PERRLA, conjunctiva and sclera clear; hearing grossly intact  NECK: Supple, non tender  CHEST/LUNG: CTAB, no wheezes, ronchi, rales  HEART: Normal rate, regular rhythm; No murmurs, rubs, or gallops; No JVD or peripheral edema  ABDOMEN: soft, NTTP, nondistended, normoactive BS  MSK/EXTREMITIES:  Grossly normal strength, movement, tone, and ROM; Palpable peripheral pulses; No clubbing or cyanosis  PSYCH: AAOx4  NEUROLOGY: Non focal   SKIN: No rashes or lesions      WEIGHT:   Admission Weight (kg): 81.7 (03-02-18)  Daily Weight (kg): 81.7 (03-02-18)    I/O SUMMARY:    03-01-18 @ 07:01  -  03-02-18 @ 07:00  --------------------------------------------------------  IN: 90 mL / OUT: 2100 mL / NET: -2010 mL    03-02-18 @ 07:01  -  03-02-18 @ 09:24  --------------------------------------------------------  IN: 0 mL / OUT: 400 mL / NET: -400 mL        LABS:                         10.0   4.76  >-----< 247           ( 03-02-18 @ 05:00 )             32.6       140  |  104  |   13  ----------------------< 109    (03-02-18 @ 05:00)     4.2  |  22  |  0.67    Anion Gap: --    Ca   8.4   (03-02-18 @ 05:00)  Mg   2.3   (03-02-18 @ 05:00)  Phos 3.7   (03-02-18 @ 05:00)       TP 8.4     |  AST 3.5    -------------------------     Alb x     |  ALT x               (03-02-18 @ 05:00)  -------------------------     T-bili 3.5  |  AlkPh 59    D-bili x   COAGULATION STUDIES:     aPTT  28.7 SEC    (02-28-18 @ 14:26)     PT     11.8 SEC    (02-28-18 @ 14:26)     INR    1.03          (02-28-18 @ 14:26)     POCT Blood Glucose.: 85 mg/dL (03-01-18 @ 15:16)        MICROBIOLOGY:      ECHO:       RADIOLOGY & ADDITIONAL TESTS: PATIENT: SAINTHILAIRE, JUDITH   AGE: 48yo   GENDER: Female  ALLERGIES: No Known Allergies    CHIEF COMPLAINT:   Palpitations (28 Feb 2018 19:17)    INTERVAL EVENTS:   Multiple episodes of NSVT overnight. Pt endorses palpitations; denies CP, SOB, HA, dizziness, N/V, vision changes. Ordered for NPO @ midnight for VT ablation today, however pt admits eating an orange at ~07:00 this morning.       MEDICATIONS  chlorhexidine 4% Liquid 1 Application(s) Topical daily  heparin  Injectable 5000 Unit(s) SubCutaneous every 8 hours  metoprolol     tartrate 12.5 milliGRAM(s) Oral two times a day      VITAL SIGNS (last 24 hrs):  ICU Vital Signs Last 24 Hrs  T(C): 36.7 (02 Mar 2018 08:00), Max: 36.8 (02 Mar 2018 00:00)  T(F): 98 (02 Mar 2018 08:00), Max: 98.2 (02 Mar 2018 00:00)  HR: 78 (02 Mar 2018 08:00) (60 - 101)  BP: 107/71 (02 Mar 2018 08:00) (94/54 - 145/82)  BP(mean): 80 (02 Mar 2018 08:00) (63 - 96)  ABP: --  ABP(mean): --  RR: 21 (02 Mar 2018 08:00) (13 - 24)  SpO2: 100% (02 Mar 2018 08:00) (96% - 100%)      PHYSICAL EXAM:  GENERAL: NAD  HEENT:  AT/NC; EOMI, PERRLA, conjunctiva and sclera clear; hearing grossly intact  NECK: Supple, non tender  CHEST/LUNG: CTAB, no wheezes, ronchi, rales  HEART: Normal rate, irregular rhythm; No murmurs, rubs, or gallops; No JVD or peripheral edema  ABDOMEN: soft, NTTP, nondistended, normoactive BS  MSK/EXTREMITIES:  Grossly normal strength, movement, tone, and ROM; Palpable peripheral pulses; No clubbing or cyanosis  PSYCH: AAOx4  NEUROLOGY: Non focal   SKIN: No rashes or lesions      WEIGHT:   Admission Weight (kg): 81.7 (03-02-18)  Daily Weight (kg): 81.7 (03-02-18)    I/O SUMMARY:    03-01-18 @ 07:01  -  03-02-18 @ 07:00  --------------------------------------------------------  IN: 90 mL / OUT: 2100 mL / NET: -2010 mL    03-02-18 @ 07:01  -  03-02-18 @ 09:24  --------------------------------------------------------  IN: 0 mL / OUT: 400 mL / NET: -400 mL        LABS:                         10.0   4.76  >-----< 247           ( 03-02-18 @ 05:00 )             32.6       140  |  104  |   13  ----------------------< 109    (03-02-18 @ 05:00)     4.2  |  22  |  0.67    Anion Gap: --    Ca   8.4   (03-02-18 @ 05:00)  Mg   2.3   (03-02-18 @ 05:00)  Phos 3.7   (03-02-18 @ 05:00)       TP 8.4     |  AST 3.5    -------------------------     Alb x     |  ALT x               (03-02-18 @ 05:00)  -------------------------     T-bili 3.5  |  AlkPh 59    D-bili x   COAGULATION STUDIES:     aPTT  28.7 SEC    (02-28-18 @ 14:26)     PT     11.8 SEC    (02-28-18 @ 14:26)     INR    1.03          (02-28-18 @ 14:26)     POCT Blood Glucose.: 85 mg/dL (03-01-18 @ 15:16)        RADIOLOGY & ADDITIONAL TESTS:    < from: MR Cardiac w/wo IV Cont (03.01.18 @ 13:37) >    EXAM:  MR CARD MORPH FUNCTION WAW IC        PROCEDURE DATE:  Mar  1 2018         INTERPRETATION:  CARDIAC MRI WITH AND WITHOUT CONTRAST    CLINICAL HISTORY: V. tach.     COMPARISON: None    TECHNIQUE: GE 1.5T MRI scanner. Morphologic and dynamic cine imaging were   performed in multiple projections. ? cc of gadolinium contrast was   administered for delayed enhancement imaging. MRA chest was also   performed during this study with MIP and VR reformats obtained on an   independent workstation.    FINDINGS:  Cardiac morphology:  The cardiac chambers demonstrate normal atrioventricular and   ventriculoarterial concordance. The visualized thoracic aorta is normal   in course, caliber, and contour. The main pulmonary artery is normal in   size.The systemic and pulmonary venous return appears to be normal.      VENTIRCLES:     Absolute measure          LVEDV: 126 ml      LVESV: 52 ml      LVSV: 74 ml    LVEF: 59 % (normal > 56%)    Cardiac output: 4.9 L/min  Cardiac index: 2.6 L/min/m2   LVmass: 114 g      LV measurements: (I=Indexed to BSA)  LVEDVI: 65 ml/m2 (normal < 95)  LVESVI: 27 ml/m2  LVSVI: 38 ml/m2   LV mass indexed: 59 g/m2 (normal < 80)      LV JAZMÍN: 58 mm (normal < 60)  Anterior septal wall: 6 mm  Posterior lateral wall: 7 mm      LV ESD: 29 mm    Left atrium:   The left atrium is normal in size.      Right Ventricle:   There is normal RV size and normal global systolic function. There is no   fatty infiltration of the RV wall. No delayed enhancement is seen within   theright ventricular myocardium. There are no regional wall motion   abnormalities or focal aneurysmal motion.      Aortic valve:   Quantification was not performed; however, qualitatively there are no   abnormal flow jets to suggest aortic stenosis or regurgitation.     Pulmonic valve: Quantification was not performed; however, qualitatively   there are no abnormal flow jets to suggest aortic stenosis or   regurgitation    Mitral valve:  No significant mitral stenosis or regurgitation.    Tricuspidvalve:   No significant tricuspid stenosis or regurgitation.    Pericardium:   No pericardial thickening or pericardial effusion is identified.    Extracardiac findings:   The chest wall and mediastinum are unremarkable. No pleural effusions.    Limited evaluation of the lungs demonstrate no abnormal signal   characteristics.      IMPRESSION:  1.  Normal left ventricular size and systolic function. No myocardial   edema. No delayed enhancement was visualized in the left ventricular   myocardium.    2.  Normal right ventricular size and systolic function. No delayed   enhancement or fatty infiltration was visualized in the right ventricular   myocardium.    3.  No significant valvular abnormalities.

## 2018-03-02 NOTE — CHART NOTE - NSCHARTNOTEFT_GEN_A_CORE
Patient seen and examined. Appears comfortable. Denies any chest pain, shortness of breath, dizziness, lightheadedness, near syncope or syncope.   Vital signs : Stable   Telemetry : SInus rhythm 60's.   Labs : reviewed   Patient remains NPO for today's procedure.     Please refer to the EPS pre- assessment form for today's exam and findings.    Out patient Follow up 3/23/18 @ 9 am with Dr. Thompson   Thank you.     Elisabet Thornton, JAJA-C  [id number]

## 2018-03-02 NOTE — PROGRESS NOTE ADULT - PROBLEM SELECTOR PLAN 4
DVT ppx: HSQ  Diet: diabetic. DVT ppx: HSQ  Diet: diabetic.    Jay Thapa MD  PGY-1 | Internal Medicine  CCU [phone]

## 2018-03-02 NOTE — PROGRESS NOTE ADULT - PROBLEM SELECTOR PLAN 1
Per EP: Likely papillary mediated VT  - Monitor off antiarrhythmics. Can give bolus Lidocaine 2% 100mg IV over 3-5 minutes per Dr. Thompson if patient in VT.  - Avoid Amiodarone for now.  May administer BB if VT recur after Lidocaine  - Echocardiogram; cardiac catheterization tomorrow to r/u CAD as VT etiology  - CMR (cardiac MR) to check for infiltrative cardiac disease prior to RF ablation     - Keep K>4.0 and Mg>1.8  - plan for ablation likely Friday 3/2, following TTE, CMR and C Per EP: Likely papillary mediated VT  - Monitor off antiarrhythmics. Can give bolus Lidocaine 2% 100mg IV over 3-5 minutes per Dr. Thompson if patient in VT.  - Avoid Amiodarone for now.  May administer BB if VT recur after Lidocaine  - Normal LHC 3/1/18  - Cardiac MRI 3/1/18 showin. Normal LV size, syst fxn. No myocardial edema. No delayed enhancement in LV myocardium.     2. Normal RV size, syst fxn. No delayed enhancement or fatty infiltration in RV myocardium.     3.  No significant valvular abnormalities.  - Plan for ablation today.  - Keep K>4.0 and Mg>1.8

## 2018-03-03 ENCOUNTER — TRANSCRIPTION ENCOUNTER (OUTPATIENT)
Age: 48
End: 2018-03-03

## 2018-03-03 VITALS
HEART RATE: 75 BPM | RESPIRATION RATE: 16 BRPM | DIASTOLIC BLOOD PRESSURE: 63 MMHG | SYSTOLIC BLOOD PRESSURE: 93 MMHG | TEMPERATURE: 98 F

## 2018-03-03 LAB
ALBUMIN SERPL ELPH-MCNC: 3.3 G/DL — SIGNIFICANT CHANGE UP (ref 3.3–5)
ALP SERPL-CCNC: 59 U/L — SIGNIFICANT CHANGE UP (ref 40–120)
ALT FLD-CCNC: 14 U/L — SIGNIFICANT CHANGE UP (ref 4–33)
APTT BLD: 25.4 SEC — LOW (ref 27.5–37.4)
AST SERPL-CCNC: 125 U/L — HIGH (ref 4–32)
BILIRUB SERPL-MCNC: 0.2 MG/DL — SIGNIFICANT CHANGE UP (ref 0.2–1.2)
BUN SERPL-MCNC: 13 MG/DL — SIGNIFICANT CHANGE UP (ref 7–23)
CALCIUM SERPL-MCNC: 7.8 MG/DL — LOW (ref 8.4–10.5)
CHLORIDE SERPL-SCNC: 105 MMOL/L — SIGNIFICANT CHANGE UP (ref 98–107)
CO2 SERPL-SCNC: 20 MMOL/L — LOW (ref 22–31)
CREAT SERPL-MCNC: 0.67 MG/DL — SIGNIFICANT CHANGE UP (ref 0.5–1.3)
GLUCOSE SERPL-MCNC: 147 MG/DL — HIGH (ref 70–99)
HCT VFR BLD CALC: 30.3 % — LOW (ref 34.5–45)
HGB BLD-MCNC: 9.8 G/DL — LOW (ref 11.5–15.5)
INR BLD: 1.05 — SIGNIFICANT CHANGE UP (ref 0.88–1.17)
MAGNESIUM SERPL-MCNC: 1.9 MG/DL — SIGNIFICANT CHANGE UP (ref 1.6–2.6)
MCHC RBC-ENTMCNC: 27.5 PG — SIGNIFICANT CHANGE UP (ref 27–34)
MCHC RBC-ENTMCNC: 32.3 % — SIGNIFICANT CHANGE UP (ref 32–36)
MCV RBC AUTO: 85.1 FL — SIGNIFICANT CHANGE UP (ref 80–100)
NRBC # FLD: 0 — SIGNIFICANT CHANGE UP
PHOSPHATE SERPL-MCNC: 3.3 MG/DL — SIGNIFICANT CHANGE UP (ref 2.5–4.5)
PLATELET # BLD AUTO: 261 K/UL — SIGNIFICANT CHANGE UP (ref 150–400)
PMV BLD: 10.6 FL — SIGNIFICANT CHANGE UP (ref 7–13)
POTASSIUM SERPL-MCNC: 4.7 MMOL/L — SIGNIFICANT CHANGE UP (ref 3.5–5.3)
POTASSIUM SERPL-SCNC: 4.7 MMOL/L — SIGNIFICANT CHANGE UP (ref 3.5–5.3)
PROT SERPL-MCNC: 6.7 G/DL — SIGNIFICANT CHANGE UP (ref 6–8.3)
PROTHROM AB SERPL-ACNC: 12.1 SEC — SIGNIFICANT CHANGE UP (ref 9.8–13.1)
RBC # BLD: 3.56 M/UL — LOW (ref 3.8–5.2)
RBC # FLD: 14.4 % — SIGNIFICANT CHANGE UP (ref 10.3–14.5)
SODIUM SERPL-SCNC: 137 MMOL/L — SIGNIFICANT CHANGE UP (ref 135–145)
WBC # BLD: 11.72 K/UL — HIGH (ref 3.8–10.5)
WBC # FLD AUTO: 11.72 K/UL — HIGH (ref 3.8–10.5)

## 2018-03-03 PROCEDURE — 99239 HOSP IP/OBS DSCHRG MGMT >30: CPT

## 2018-03-03 RX ORDER — METOPROLOL TARTRATE 50 MG
1 TABLET ORAL
Qty: 60 | Refills: 0 | OUTPATIENT
Start: 2018-03-03 | End: 2018-04-01

## 2018-03-03 RX ORDER — METOPROLOL TARTRATE 50 MG
12.5 TABLET ORAL ONCE
Qty: 0 | Refills: 0 | Status: COMPLETED | OUTPATIENT
Start: 2018-03-03 | End: 2018-03-03

## 2018-03-03 RX ORDER — METOPROLOL TARTRATE 50 MG
25 TABLET ORAL
Qty: 0 | Refills: 0 | Status: DISCONTINUED | OUTPATIENT
Start: 2018-03-03 | End: 2018-03-03

## 2018-03-03 RX ORDER — ACETAMINOPHEN 500 MG
650 TABLET ORAL EVERY 6 HOURS
Qty: 0 | Refills: 0 | Status: DISCONTINUED | OUTPATIENT
Start: 2018-03-03 | End: 2018-03-03

## 2018-03-03 RX ADMIN — Medication 12.5 MILLIGRAM(S): at 10:06

## 2018-03-03 RX ADMIN — HEPARIN SODIUM 5000 UNIT(S): 5000 INJECTION INTRAVENOUS; SUBCUTANEOUS at 06:37

## 2018-03-03 RX ADMIN — Medication 650 MILLIGRAM(S): at 02:30

## 2018-03-03 RX ADMIN — Medication 650 MILLIGRAM(S): at 03:00

## 2018-03-03 RX ADMIN — Medication 12.5 MILLIGRAM(S): at 06:39

## 2018-03-03 NOTE — DISCHARGE NOTE ADULT - CARE PLAN
Principal Discharge DX:	Ventricular tachycardia  Goal:	s/p ablation  Assessment and plan of treatment:	You came in with palpitations and were found to have paroxysmal ventricular tachycardia, an abnormal heart rhythm. You underwent an ablation for this and were started on a medication called metoprolol to control your heart rate. Continue taking as directed.   - You have a follow up appointment scheduled for 3/23/18 at 9am with Dr. Thompson.

## 2018-03-03 NOTE — DISCHARGE NOTE ADULT - OTHER SIGNIFICANT FINDINGS
CARDIAC MRI:  < from: MR Cardiac w/wo IV Cont (18 @ 13:37) >  PROCEDURE DATE:  Mar  1 2018   IMPRESSION:  1.  Normal left ventricular size and systolic function. No myocardial   edema. No delayed enhancement was visualized in the left ventricular   myocardium.  2.  Normal right ventricular size and systolic function. No delayed   enhancement or fatty infiltration was visualized in the right ventricular   myocardium.  3.  No significant valvular abnormalities.  TIFF FERREIRA M.D., ATTENDING RADIOLOGIST  This document has been electronically signed. Mar  1 2018  1:43PM  < end of copied text >        CARDIAC CATHETERIZATION  < from: Cardiac Cath Lab - Adult (18 @ 16:23) >  Case Physician(s):  Marguerite Chandler M.D.  Fellow:  Jalyn Pereira D.O.  Referring Physician:  INDICATIONS: Precordial pain.  HISTORY: No history of previous myocardial infarction. There was no history  of diabetes or hypertension. There was no family history of coronary  artery disease.  PROCEDURE:  --  Left heart catheterization with ventriculography.  --  Left coronary angiography.  --  Right coronary angiography.  VENTRICLES: There were no left ventricular global or regional wall motion  abnormalities.  CORONARY VESSELS: The coronary circulation is right dominant.  LM:   --  LM: Normal.  LAD:   --  LAD: Normal.  CX:   --  Circumflex: Normal.  RCA:   --  RCA: Normal.  COMPLICATIONS: There were no complications.  DIAGNOSTIC IMPRESSIONS: The coronary anatomy is normal.  DIAGNOSTIC RECOMMENDATIONS: Medical therapy  INTERVENTIONAL RECOMMENDATIONS: Medical therapy  Prepared and signed by  Marguerite Chandler M.D.  Signed 2018 19:49:19  HEMODYNAMIC TABLES  Pressures:  Baseline  Pressures:  - HR: 68  Pressures:  - Rhythm:  Pressures:  -- Left Ventricle (s/edp): 119/8/--  Pressures:  Post Angio  Pressures:  - HR: 75  Pressures:  - Rhythm:  Pressures:  -- Aortic Pressure (S/D/M): 125/70/69  Pressures:  -- Left Ventricle (s/edp): 126/12/--  Outputs:  Baseline  Outputs:  -- CALCULATIONS[age]  Outputs:  -- CALCULATIONS: BodySurface Area: 1.90  Outputs:  -- CALCULATIONS: Height in cm: 165.00  Outputs:  -- CALCULATIONS: Sex: Female  Outputs:  -- CALCULATIONS: Weight in k.40  Outputs:  -- OUTPUTS: O2 consumption: 237.26  Outputs:  -- OUTPUTS: Vo2 Indexed: 125.00  Outputs:  Post Angio  Outputs:  -- OUTPUTS: O2 consumption: 237.26  Outputs:  -- OUTPUTS: Vo2 Indexed: 125.00  < end of copied text >

## 2018-03-03 NOTE — DISCHARGE NOTE ADULT - MEDICATION SUMMARY - MEDICATIONS TO TAKE
I will START or STAY ON the medications listed below when I get home from the hospital:    metoprolol tartrate 25 mg oral tablet  -- 1 tab(s) by mouth 2 times a day  -- Indication: For Ventricular tachycardia

## 2018-03-03 NOTE — DISCHARGE NOTE ADULT - PATIENT PORTAL LINK FT
You can access the [facility], offered by [facility], by registering with the following website: [email or web address]

## 2018-03-03 NOTE — PROGRESS NOTE ADULT - SUBJECTIVE AND OBJECTIVE BOX
Date of Admission:  2/28/18  24H hour events:   s/p VT ablation yesterday, 8 beats of NSVT overnight at 22:36  Vital Signs Last 24 Hrs  T(C): 36.8  Max: 36.8 (02 Mar 2018 21:32)  T(F): 98.3 (03 Mar 2018 08:00), Max: 98.3 (03 Mar 2018 06:00)  HR: 87 (03 Mar 2018 08:00) (60 - 88)  BP: 125/65 (03 Mar 2018 08:00) (108/74 - 127/80)  BP(mean): 79 (03 Mar 2018 08:00) (78 - 91)  RR: 21 (03 Mar 2018 08:00) (14 - 22)  SpO2: 100% (03 Mar 2018 08:00) (96% - 100%)  I&O's Summary    02 Mar 2018 07:01  -  03 Mar 2018 07:00  --------------------------------------------------------  IN: 210 mL / OUT: 950 mL / NET: -740 mL        MEDICATIONS:  heparin  Injectable 5000 Unit(s) SubCutaneous every 8 hours  metoprolol     tartrate 12.5 milliGRAM(s) Oral two times a day  acetaminophen   Tablet. 650 milliGRAM(s) Oral every 6 hours PRN  chlorhexidine 4% Liquid 1 Application(s) Topical daily    REVIEW OF SYSTEMS:  Complete 10point ROS negative.    PHYSICAL EXAM:    LABS:	                       9.8    11.72 )-----------( 261      ( 03 Mar 2018 06:20 )             30.3   03 Mar 2018 06:20    137    |  105    |  13     ----------------------------<  147<H>  4.7     |  20<L>  |  0.67     Ca    7.8<L>      03 Mar 2018 06:20  Phos  3.3       03 Mar 2018 06:20  Mg     1.9       03 Mar 2018 06:20    TPro  6.7    /  Alb  3.3    /  TBili  0.2    /  DBili  x      /  AST  125<H>  /  ALT  14     /  AlkPhos  59     03 Mar 2018 06:20

## 2018-03-03 NOTE — PROGRESS NOTE ADULT - SUBJECTIVE AND OBJECTIVE BOX
24H hour events: s/p PVC ablation with 8 beat run of AT    MEDICATIONS:  heparin  Injectable 5000 Unit(s) SubCutaneous every 8 hours  metoprolol     tartrate 25 milliGRAM(s) Oral two times a day        acetaminophen   Tablet. 650 milliGRAM(s) Oral every 6 hours PRN        chlorhexidine 4% Liquid 1 Application(s) Topical daily      REVIEW OF SYSTEMS:  Complete 10point ROS negative.    PHYSICAL EXAM:  T(C): 36.7 (03-03-18 @ 12:00), Max: 36.8 (03-02-18 @ 21:32)  HR: 75 (03-03-18 @ 12:00) (60 - 101)  BP: 93/63 (03-03-18 @ 12:00) (93/63 - 145/98)  RR: 16 (03-03-18 @ 12:00) (14 - 24)  SpO2: 100% (03-03-18 @ 08:00) (96% - 100%)  Wt(kg): --  I&O's Summary    02 Mar 2018 07:01  -  03 Mar 2018 07:00  --------------------------------------------------------  IN: 210 mL / OUT: 950 mL / NET: -740 mL        Appearance: NAD  HEENT:   Normal oral mucosa, EOMI	  Cardiovascular: Normal S1 S2, No JVD, No murmurs, No edema, right groin c/d/i, no echhymosis/hematoma  Respiratory: Lungs clear to auscultation	  Psychiatry: A & O x 3, Mood & affect appropriate  Gastrointestinal:  Soft, Non-tender, + BS	  Skin: No rashes, No ecchymoses, No cyanosis	  Extremities: No clubbing, cyanosis or edema  Vascular: Peripheral pulses palpable 2+ bilaterally        LABS:	 	    CBC Full  -  ( 03 Mar 2018 06:20 )  WBC Count : 11.72 K/uL  Hemoglobin : 9.8 g/dL  Hematocrit : 30.3 %  Platelet Count - Automated : 261 K/uL  Mean Cell Volume : 85.1 fL  Mean Cell Hemoglobin : 27.5 pg  Mean Cell Hemoglobin Concentration : 32.3 %  Auto Neutrophil # : x  Auto Lymphocyte # : x  Auto Monocyte # : x  Auto Eosinophil # : x  Auto Basophil # : x  Auto Neutrophil % : x  Auto Lymphocyte % : x  Auto Monocyte % : x  Auto Eosinophil % : x  Auto Basophil % : x    03-03    137  |  105  |  13  ----------------------------<  147<H>  4.7   |  20<L>  |  0.67  03-02    140  |  104  |  13  ----------------------------<  109<H>  4.2   |  22  |  0.67    Ca    7.8<L>      03 Mar 2018 06:20  Ca    8.4      02 Mar 2018 05:00  Phos  3.3     03-03  Phos  3.7     03-02  Mg     1.9     03-03  Mg     2.3     03-02    TPro  6.7  /  Alb  3.3  /  TBili  0.2  /  DBili  x   /  AST  125<H>  /  ALT  14  /  AlkPhos  59  03-03  TPro  6.7  /  Alb  3.5  /  TBili  0.2  /  DBili  x   /  AST  14  /  ALT  8   /  AlkPhos  59  03-02    TELEMETRY: 	  NSR 70-90s with 8 beats of AT  	  	  ASSESSMENT/PLAN: 	In summary, Ms. Sainthilarie is a 47 year old female with PMH of HTN, HLD and DM T2-not on meds presented to ED with acute onset of palpitations found to have paroxysmal VT s/p VT ablation with 8 beat AT.    agree with increase in BB to lopressor 50mg BID  f/u with Dr. Kurtz on 3/23 at 9:30am  d/c planning    Anuj Funk MD

## 2018-03-03 NOTE — DISCHARGE NOTE ADULT - INSTRUCTIONS
DASH, low sodium diet Observe left wrist & right R groin soft, no bleeding, hematoma  2+ DP & PT pulses, R & L for swelling, bleeding, drainage, pain DASH, low sodium diet  low carb (ADA)

## 2018-03-03 NOTE — DISCHARGE NOTE ADULT - CARE PROVIDER_API CALL
Lucho Thompson (MD), Cardiac Electrophysiology; Cardiovascular Disease; Internal Medicine  [id number] [place] [address]  Phone: [phone]  Fax: [phone]

## 2018-03-03 NOTE — DISCHARGE NOTE ADULT - PLAN OF CARE
s/p ablation You came in with palpitations and were found to have paroxysmal ventricular tachycardia, an abnormal heart rhythm. You underwent an ablation for this and were started on a medication called metoprolol to control your heart rate. Continue taking as directed.   - You have a follow up appointment scheduled for 3/23/18 at 9am with Dr. Thompson.

## 2018-03-03 NOTE — PROGRESS NOTE ADULT - ASSESSMENT
47 year old female with PMH of HTN, HLD and DM T2-not on meds presented to ED with acute onset of palpitations found to have paroxysmal VT(both sustained and non-sustained) since last night.  VT morphology suggest left fascicular posterior VT origin.  Patient is highly symptomatic in VT, therefore EP study with RF ablation is recommended.
47 year old female with PMH of HTN, HLD and DM T2-not on meds presented to ED with acute onset of palpitations found to have paroxysmal VT(both sustained and non-sustained) since last night.  VT morphology suggest left fascicular posterior VT origin.  Patient is highly symptomatic in VT, therefore EP study with RF ablation is recommended.
47 year old female with PMH of HTN, HLD and DM T2-not on meds presented to ED with acute onset of palpitations found to have paroxysmal VT(both sustained and non-sustained) since last night.  VT morphology suggest left fascicular posterior VT origin. s/p VT ablation

## 2018-03-03 NOTE — PROGRESS NOTE ADULT - ATTENDING COMMENTS
Agree with above assessment and plan  S/P LHC yesterday and Cardiac MRI that were both normal.  Await VT ablation this afternoon  Off lidocaine but remains on metoprolol  Will return to CCU after ablation    Elizabeth Hoang MD  [id number]
Patient seen and examined. Agree with above assessment and plan  Patient admitted with paroxysmal VT  EP input appreciated- patient going to get cardiac MRI (today) and possible LHC  Will undergo possible RF ablation on Friday  Cont metoprolol and give lidocaine as outlined by ROGERS Hoang MD  [id number]
Patient seen and examined. Agree with above assessment and plan  Patient sp ablation yesterday  Some PVCs overnight  Metoprolol 25 mg BID  DC home with outpatient followup

## 2018-03-03 NOTE — DISCHARGE NOTE ADULT - HOSPITAL COURSE
47 year old female with PMH of HTN, HLD and DM T2-not on meds presented to ED with acute onset of palpitations found to have paroxysmal VT(both sustained and non-sustained).  VT morphology suggestive of left fascicular posterior VT origin. Patient underwent Cardiac MRI and LHC, which were both unremarkarble, prior to undergoing a VT ablation. Patient was successfully ablated and started on metoprolol 25mg PO BID for rate control. Patient remained hemodynamically stable and considered safe for discharge home with instructions to follow up with Dr. Thompson on March 23rd.

## 2018-03-03 NOTE — PROGRESS NOTE ADULT - PROBLEM SELECTOR PLAN 1
-S/p ablation, had 8 beats NSVT post ablation  -Continue BB with lopressor 12.5 bid -S/p ablation, had 8 beats NSVT post ablation  -increased lopressor to 25mg po bid

## 2018-03-06 ENCOUNTER — EMERGENCY (EMERGENCY)
Facility: HOSPITAL | Age: 48
LOS: 1 days | Discharge: HOME | End: 2018-03-06
Attending: EMERGENCY MEDICINE | Admitting: EMERGENCY MEDICINE
Payer: MEDICAID

## 2018-03-06 VITALS
SYSTOLIC BLOOD PRESSURE: 113 MMHG | OXYGEN SATURATION: 100 % | RESPIRATION RATE: 16 BRPM | TEMPERATURE: 98 F | DIASTOLIC BLOOD PRESSURE: 82 MMHG | HEART RATE: 86 BPM

## 2018-03-06 VITALS
RESPIRATION RATE: 16 BRPM | OXYGEN SATURATION: 100 % | HEART RATE: 79 BPM | DIASTOLIC BLOOD PRESSURE: 74 MMHG | TEMPERATURE: 98 F | SYSTOLIC BLOOD PRESSURE: 101 MMHG

## 2018-03-06 LAB
ALBUMIN SERPL ELPH-MCNC: 3.8 G/DL — SIGNIFICANT CHANGE UP (ref 3.3–5)
ALP SERPL-CCNC: 69 U/L — SIGNIFICANT CHANGE UP (ref 40–120)
ALT FLD-CCNC: 14 U/L — SIGNIFICANT CHANGE UP (ref 4–33)
APTT BLD: 27.7 SEC — SIGNIFICANT CHANGE UP (ref 27.5–37.4)
AST SERPL-CCNC: 26 U/L — SIGNIFICANT CHANGE UP (ref 4–32)
BASOPHILS # BLD AUTO: 0.02 K/UL — SIGNIFICANT CHANGE UP (ref 0–0.2)
BASOPHILS NFR BLD AUTO: 0.3 % — SIGNIFICANT CHANGE UP (ref 0–2)
BILIRUB SERPL-MCNC: 0.5 MG/DL — SIGNIFICANT CHANGE UP (ref 0.2–1.2)
BUN SERPL-MCNC: 11 MG/DL — SIGNIFICANT CHANGE UP (ref 7–23)
CALCIUM SERPL-MCNC: 8.8 MG/DL — SIGNIFICANT CHANGE UP (ref 8.4–10.5)
CHLORIDE SERPL-SCNC: 101 MMOL/L — SIGNIFICANT CHANGE UP (ref 98–107)
CO2 SERPL-SCNC: 26 MMOL/L — SIGNIFICANT CHANGE UP (ref 22–31)
CREAT SERPL-MCNC: 0.76 MG/DL — SIGNIFICANT CHANGE UP (ref 0.5–1.3)
EOSINOPHIL # BLD AUTO: 0.01 K/UL — SIGNIFICANT CHANGE UP (ref 0–0.5)
EOSINOPHIL NFR BLD AUTO: 0.2 % — SIGNIFICANT CHANGE UP (ref 0–6)
GLUCOSE SERPL-MCNC: 130 MG/DL — HIGH (ref 70–99)
HCT VFR BLD CALC: 29.9 % — LOW (ref 34.5–45)
HGB BLD-MCNC: 9.1 G/DL — LOW (ref 11.5–15.5)
IMM GRANULOCYTES # BLD AUTO: 0.03 # — SIGNIFICANT CHANGE UP
IMM GRANULOCYTES NFR BLD AUTO: 0.5 % — SIGNIFICANT CHANGE UP (ref 0–1.5)
INR BLD: 1.04 — SIGNIFICANT CHANGE UP (ref 0.88–1.17)
LYMPHOCYTES # BLD AUTO: 1.16 K/UL — SIGNIFICANT CHANGE UP (ref 1–3.3)
LYMPHOCYTES # BLD AUTO: 18.1 % — SIGNIFICANT CHANGE UP (ref 13–44)
MCHC RBC-ENTMCNC: 26.6 PG — LOW (ref 27–34)
MCHC RBC-ENTMCNC: 30.4 % — LOW (ref 32–36)
MCV RBC AUTO: 87.4 FL — SIGNIFICANT CHANGE UP (ref 80–100)
MONOCYTES # BLD AUTO: 0.27 K/UL — SIGNIFICANT CHANGE UP (ref 0–0.9)
MONOCYTES NFR BLD AUTO: 4.2 % — SIGNIFICANT CHANGE UP (ref 2–14)
NEUTROPHILS # BLD AUTO: 4.93 K/UL — SIGNIFICANT CHANGE UP (ref 1.8–7.4)
NEUTROPHILS NFR BLD AUTO: 76.7 % — SIGNIFICANT CHANGE UP (ref 43–77)
NRBC # FLD: 0 — SIGNIFICANT CHANGE UP
PLATELET # BLD AUTO: 240 K/UL — SIGNIFICANT CHANGE UP (ref 150–400)
PMV BLD: 10.5 FL — SIGNIFICANT CHANGE UP (ref 7–13)
POTASSIUM SERPL-MCNC: 4.7 MMOL/L — SIGNIFICANT CHANGE UP (ref 3.5–5.3)
POTASSIUM SERPL-SCNC: 4.7 MMOL/L — SIGNIFICANT CHANGE UP (ref 3.5–5.3)
PROT SERPL-MCNC: 7.3 G/DL — SIGNIFICANT CHANGE UP (ref 6–8.3)
PROTHROM AB SERPL-ACNC: 11.5 SEC — SIGNIFICANT CHANGE UP (ref 9.8–13.1)
RBC # BLD: 3.42 M/UL — LOW (ref 3.8–5.2)
RBC # FLD: 14.5 % — SIGNIFICANT CHANGE UP (ref 10.3–14.5)
SODIUM SERPL-SCNC: 139 MMOL/L — SIGNIFICANT CHANGE UP (ref 135–145)
TROPONIN T SERPL-MCNC: 0.62 NG/ML — HIGH (ref 0–0.06)
TSH SERPL-MCNC: 0.98 UIU/ML — SIGNIFICANT CHANGE UP (ref 0.27–4.2)
WBC # BLD: 6.42 K/UL — SIGNIFICANT CHANGE UP (ref 3.8–10.5)
WBC # FLD AUTO: 6.42 K/UL — SIGNIFICANT CHANGE UP (ref 3.8–10.5)

## 2018-03-06 PROCEDURE — 71046 X-RAY EXAM CHEST 2 VIEWS: CPT | Mod: 26

## 2018-03-06 PROCEDURE — 99285 EMERGENCY DEPT VISIT HI MDM: CPT | Mod: 25

## 2018-03-06 PROCEDURE — 93010 ELECTROCARDIOGRAM REPORT: CPT

## 2018-03-06 RX ORDER — MECLIZINE HCL 12.5 MG
25 TABLET ORAL ONCE
Qty: 0 | Refills: 0 | Status: COMPLETED | OUTPATIENT
Start: 2018-03-06 | End: 2018-03-06

## 2018-03-06 RX ADMIN — Medication 25 MILLIGRAM(S): at 12:28

## 2018-03-06 NOTE — ED PROVIDER NOTE - PLAN OF CARE
Please follow up with Dr. Thompson at your previously scheduled appointment. Please also follow up with your primary care provider. Please return to the ER if you experience further episodes of syncope or if you feel dizzy, lightheaded, have chest pain, shortness of breath, nausea, vomiting, diarrhea, constipation, palpitations, weakness, or for any other concerns.

## 2018-03-06 NOTE — ED PROVIDER NOTE - NEUROLOGICAL, MLM
Alert and oriented, no focal deficits, no motor or sensory deficits. Negative Romberg. No pronator drift. Normal finger to nose and heel to shin. Normal rapid alternating movements. 5/5 strength all extremities. Cranial nerves intact.

## 2018-03-06 NOTE — ED ADULT TRIAGE NOTE - CHIEF COMPLAINT QUOTE
as per EMS, pt synopsized in bathroom after feeling dizzy , denies any trauma. pmhx htn, dm, ablation pt c/o headache at this time.

## 2018-03-06 NOTE — CONSULT NOTE ADULT - SUBJECTIVE AND OBJECTIVE BOX
Patient seen and evaluated at bedside    Chief Complaint:    HPI:      PMH:   HTN (hypertension)  DM (diabetes mellitus)      PSH:   No significant past surgical history      Medications:   meclizine 25 milliGRAM(s) Oral Once      Allergies:  No Known Allergies      FAMILY HISTORY:  No pertinent family history in first degree relatives      Social History:  Smoking History:  Alcohol Use:  Drug Use:    Review of Systems:  REVIEW OF SYSTEMS:    CONSTITUTIONAL: No weakness, fevers or chills  EYES/ENT: No visual changes;  No dysphagia  NECK: No pain or stiffness  RESPIRATORY: No cough, wheezing, hemoptysis; No shortness of breath  CARDIOVASCULAR: No chest pain or palpitations; No lower extremity edema  GASTROINTESTINAL: No abdominal or epigastric pain. No nausea, vomiting, or hematemesis; No diarrhea or constipation. No melena or hematochezia.  BACK: No back pain  GENITOURINARY: No dysuria, frequency or hematuria  NEUROLOGICAL: No numbness or weakness  SKIN: No itching, burning, rashes, or lesions   All other review of systems is negative unless indicated above.    Physical Exam:  T(F): 98.4 (03-06), Max: 98.4 (03-06)  HR: 85 (03-06) (79 - 85)  BP: 109/67 (03-06) (101/74 - 109/67)  RR: 18 (03-06)  SpO2: 100% (03-06)  GENERAL: No acute distress, well-developed  HEAD:  Atraumatic, Normocephalic  ENT: EOMI, PERRLA, conjunctiva and sclera clear, Neck supple, No JVD, moist mucosa  CHEST/LUNG: Clear to auscultation bilaterally; No wheeze, equal breath sounds bilaterally   BACK: No spinal tenderness  HEART: Regular rate and rhythm; No murmurs, rubs, or gallops  ABDOMEN: Soft, Nontender, Nondistended; Bowel sounds present  EXTREMITIES:  No clubbing, cyanosis, or edema  PSYCH: Nl behavior, nl affect  NEUROLOGY: AAOx3, non-focal, cranial nerves intact  SKIN: Normal color, No rashes or lesions  LINES:    Cardiovascular Diagnostic Testing:    ECG: Personally reviewed    Echo:    Stress Testing:    Cath:    Interpretation of Telemetry:    Imaging:    Labs: Personally reviewed                        9.1    6.42  )-----------( 240      ( 06 Mar 2018 10:40 )             29.9     03-06    139  |  101  |  11  ----------------------------<  130<H>  4.7   |  26  |  0.76    Ca    8.8      06 Mar 2018 10:40    TPro  7.3  /  Alb  3.8  /  TBili  0.5  /  DBili  x   /  AST  26  /  ALT  14  /  AlkPhos  69  03-06    PT/INR - ( 06 Mar 2018 10:40 )   PT: 11.5 SEC;   INR: 1.04          PTT - ( 06 Mar 2018 10:40 )  PTT:27.7 SEC  CARDIAC MARKERS ( 06 Mar 2018 10:40 )  x     / 0.62 ng/mL / x     / x     / x              Hemoglobin A1C, Whole Blood: 6.2 % (03-01 @ 05:15)    Thyroid Stimulating Hormone, Serum: 0.98 uIU/mL (03-06 @ 10:40)  Thyroid Stimulating Hormone, Serum: 0.86 uIU/mL (02-28 @ 14:52)  Thyroid Stimulating Hormone, Serum: 0.89 uIU/mL (02-28 @ 14:26) Patient seen and evaluated at bedside    Chief Complaint: "I passed out"    HPI:  47F with HTN, HLD and DM presented to ED with syncope. Patient was discharged from [facility] two days ago, had been admitted for palpitations found to have papillary muscle VT. She was ablated on 03/02/2018 and had no recurrence afterwards. She was discharged on metoprolol tartrate 25mg bid. Work up that hospitalization included a normal TTE, cardiac MRI, and LHC. Patient this morning woke up without any complaints. She then went to the bathroom and after she was finished urinating while still sitting on the toilet, she said she felt dizzy and weak, and then passed out. She said she woke up still on the toilet 10-15 minutes later and called for an ambulance. She said this has happened to her once in the past a few months ago where she felt dizzy. She was found to be hypoglycemic at that time. Currently she says he is feeling lightheaded and has right sided weakness. She denies palpitations, shortness of breath, chest pain, diaphoresis, numbness, tingling.     PMH:   HTN (hypertension)  DM (diabetes mellitus)  HLD  Pap muscle VT s/p ablation      PSH:   No significant past surgical history      Medications:   metoprolol      Allergies:  No Known Allergies      FAMILY HISTORY:  No pertinent family history in first degree relatives      Social History:  Smoking History: denies  Alcohol Use: denies  Drug Use: denies    Review of Systems:  REVIEW OF SYSTEMS:    CONSTITUTIONAL: +weakness; No fevers or chills  EYES/ENT: No visual changes;  No dysphagia  RESPIRATORY: No cough, wheezing, hemoptysis; No shortness of breath  CARDIOVASCULAR: No chest pain or palpitations; No lower extremity edema  GASTROINTESTINAL: No abdominal or epigastric pain. No nausea, vomiting, or hematemesis; No diarrhea or constipation. No melena or hematochezia.  BACK: No back pain  GENITOURINARY: No dysuria, frequency or hematuria  NEUROLOGICAL: No numbness   SKIN: No itching, burning, rashes, or lesions     Physical Exam:  T(F): 98.4 (03-06), Max: 98.4 (03-06)  HR: 85 (03-06) (79 - 85)  BP: 109/67 (03-06) (101/74 - 109/67)  RR: 18 (03-06)  SpO2: 100% (03-06)  GENERAL: No acute distress, well-developed  HEAD:  Atraumatic, Normocephalic  ENT: EOMI, No JVD, moist mucosa  CHEST/LUNG: Clear to auscultation bilaterally; No wheeze, equal breath sounds bilaterally   HEART: Regular rate and rhythm; No murmurs, rubs, or gallops  ABDOMEN: Soft, Nontender, Nondistended; Bowel sounds present  EXTREMITIES:  No clubbing, cyanosis, or edema  PSYCH: Nl behavior, nl affect  NEUROLOGY: AAOx3, non-focal    Cardiovascular Diagnostic Testing:    ECG: Personally reviewed  SR, HR 78, normal axis and intervals, LVH with repolarization abnormalities    Echo:  < from: Transthoracic Echocardiogram (03.02.18 @ 11:09) >  1. Normal mitral valve. Minimal mitral regurgitation.  2. Normal left ventricular internal dimensions and wall  thicknesses.  3. Endocardium not well visualized; grossly normal left  ventricular systolic function.  4. The right ventricleis not well visualized; grossly  normal right ventricular systolic function.    < end of copied text >    Cath:  < from: Cardiac Cath Lab - Adult (03.01.18 @ 16:23) >  CORONARY VESSELS: The coronary circulation is right dominant.  LM:   --  LM: Normal.  LAD:   --  LAD: Normal.  CX:   --  Circumflex: Normal.  RCA:   --  RCA: Normal.    < end of copied text >    Interpretation of Telemetry:  SR 60-80s    Labs: Personally reviewed                        9.1    6.42  )-----------( 240      ( 06 Mar 2018 10:40 )             29.9     03-06    139  |  101  |  11  ----------------------------<  130<H>  4.7   |  26  |  0.76    Ca    8.8      06 Mar 2018 10:40    TPro  7.3  /  Alb  3.8  /  TBili  0.5  /  DBili  x   /  AST  26  /  ALT  14  /  AlkPhos  69  03-06    PT/INR - ( 06 Mar 2018 10:40 )   PT: 11.5 SEC;   INR: 1.04          PTT - ( 06 Mar 2018 10:40 )  PTT:27.7 SEC  CARDIAC MARKERS ( 06 Mar 2018 10:40 )  x     / 0.62 ng/mL / x     / x     / x        Hemoglobin A1C, Whole Blood: 6.2 % (03-01 @ 05:15)    Thyroid Stimulating Hormone, Serum: 0.98 uIU/mL (03-06 @ 10:40)      A/P:  47F with HTN, HLD and DM presented to ED with syncope.    Syncope. Patient seen and evaluated at bedside    Chief Complaint: "I passed out"    HPI:  47F with HTN, HLD and DM presented to ED with syncope. Patient was discharged from [facility] two days ago, had been admitted for palpitations found to have papillary muscle VT. She was ablated on 03/02/2018 and had no recurrence afterwards. She was discharged on metoprolol tartrate 25mg bid. Work up that hospitalization included a normal TTE, cardiac MRI, and LHC. Patient this morning woke up without any complaints. She then went to the bathroom and after she was finished urinating while still sitting on the toilet, she said she felt dizzy and weak, and then passed out. She said she woke up still on the toilet 10-15 minutes later and called for an ambulance. She said this has happened to her once in the past a few months ago where she felt dizzy. She was found to be hypoglycemic at that time. Currently she says he is feeling lightheaded and has right sided weakness. She denies palpitations, shortness of breath, chest pain, diaphoresis, numbness, tingling.     PMH:   HTN (hypertension)  DM (diabetes mellitus)  HLD  Pap muscle VT s/p ablation      PSH:   No significant past surgical history      Medications:   metoprolol      Allergies:  No Known Allergies      FAMILY HISTORY:  No pertinent family history in first degree relatives      Social History:  Smoking History: denies  Alcohol Use: denies  Drug Use: denies    Review of Systems:  REVIEW OF SYSTEMS:    CONSTITUTIONAL: +weakness; No fevers or chills  EYES/ENT: No visual changes;  No dysphagia  RESPIRATORY: No cough, wheezing, hemoptysis; No shortness of breath  CARDIOVASCULAR: No chest pain or palpitations; No lower extremity edema  GASTROINTESTINAL: No abdominal or epigastric pain. No nausea, vomiting, or hematemesis; No diarrhea or constipation. No melena or hematochezia.  BACK: No back pain  GENITOURINARY: No dysuria, frequency or hematuria  NEUROLOGICAL: No numbness   SKIN: No itching, burning, rashes, or lesions     Physical Exam:  T(F): 98.4 (03-06), Max: 98.4 (03-06)  HR: 85 (03-06) (79 - 85)  BP: 109/67 (03-06) (101/74 - 109/67)  RR: 18 (03-06)  SpO2: 100% (03-06)  GENERAL: No acute distress, well-developed  HEAD:  Atraumatic, Normocephalic  ENT: EOMI, No JVD, moist mucosa  CHEST/LUNG: Clear to auscultation bilaterally; No wheeze, equal breath sounds bilaterally   HEART: Regular rate and rhythm; No murmurs, rubs, or gallops  ABDOMEN: Soft, Nontender, Nondistended; Bowel sounds present  EXTREMITIES:  No clubbing, cyanosis, or edema  PSYCH: Nl behavior, nl affect  NEUROLOGY: AAOx3, non-focal    Cardiovascular Diagnostic Testing:    ECG: Personally reviewed  SR, HR 78, normal axis and intervals, LVH with repolarization abnormalities    Echo:  < from: Transthoracic Echocardiogram (03.02.18 @ 11:09) >  1. Normal mitral valve. Minimal mitral regurgitation.  2. Normal left ventricular internal dimensions and wall  thicknesses.  3. Endocardium not well visualized; grossly normal left  ventricular systolic function.  4. The right ventricleis not well visualized; grossly  normal right ventricular systolic function.    < end of copied text >    Cath:  < from: Cardiac Cath Lab - Adult (03.01.18 @ 16:23) >  CORONARY VESSELS: The coronary circulation is right dominant.  LM:   --  LM: Normal.  LAD:   --  LAD: Normal.  CX:   --  Circumflex: Normal.  RCA:   --  RCA: Normal.    < end of copied text >    Interpretation of Telemetry:  SR 60-80s    Labs: Personally reviewed                        9.1    6.42  )-----------( 240      ( 06 Mar 2018 10:40 )             29.9     03-06    139  |  101  |  11  ----------------------------<  130<H>  4.7   |  26  |  0.76    Ca    8.8      06 Mar 2018 10:40    TPro  7.3  /  Alb  3.8  /  TBili  0.5  /  DBili  x   /  AST  26  /  ALT  14  /  AlkPhos  69  03-06    PT/INR - ( 06 Mar 2018 10:40 )   PT: 11.5 SEC;   INR: 1.04          PTT - ( 06 Mar 2018 10:40 )  PTT:27.7 SEC  CARDIAC MARKERS ( 06 Mar 2018 10:40 )  x     / 0.62 ng/mL / x     / x     / x        Hemoglobin A1C, Whole Blood: 6.2 % (03-01 @ 05:15)    Thyroid Stimulating Hormone, Serum: 0.98 uIU/mL (03-06 @ 10:40)      A/P:  47F with HTN, HLD and DM presented to ED with syncope.    Syncope. Story compatible with vasovagal syncope but given recent papillary muscle PVC ablation, will send cardionet monitor home. Already has outpatient follow up with EP, Dr. Thompson.      Tomas Rooney MD  Cardiology Fellow [id number]

## 2018-03-06 NOTE — ED PROVIDER NOTE - ATTENDING CONTRIBUTION TO CARE
I performed a face to face bedside interview with patient regarding history of present illness, review of symptoms and past medical history. I completed an independent physical exam.  I have discussed patient's plan of care.   I agree with note as stated above, having amended the EMR as needed to reflect my findings. I have discussed the assessment and plan of care.  This includes during the time I functioned as the attending physician for this patient.  Attending Contribution to Care: agree with plan of resident. pt p/w syncopal episode, recent ablation. ekg with no pvc, vt. cleared by ep. labs wnl. stable for d/c.

## 2018-03-06 NOTE — ED PROVIDER NOTE - CARE PLAN
Principal Discharge DX:	Syncope  Assessment and plan of treatment:	Please follow up with Dr. Thompson at your previously scheduled appointment. Please also follow up with your primary care provider. Please return to the ER if you experience further episodes of syncope or if you feel dizzy, lightheaded, have chest pain, shortness of breath, nausea, vomiting, diarrhea, constipation, palpitations, weakness, or for any other concerns.

## 2018-03-06 NOTE — ED PROVIDER NOTE - MEDICAL DECISION MAKING DETAILS
48 yo F w/ recent runs of vtach s/p ablation and discharged from hospital 2 days ago presenting w/ syncopal episode this am. Feels lightheaded in ED, but otherwise negative ROS. Normal physical exam, including normal neuro exam, negative Romberg. Will obtain ekg, trop, cbc, cmp, contact electrophysiology and treat lightheadedness/vertigo. Reassess.

## 2018-03-06 NOTE — ED PROVIDER NOTE - OBJECTIVE STATEMENT
47 year old female with PMH of HTN, HLD and DM T2-not on meds presented to ED with CC of syncope. Patient was discharged from [facility] two days ago, had been admitted d/t acute onset of palpitations with EKG evidence of SR with intermittent frequent runs of sustained and non-sustained VT up to 15-20 seconds,  VT morphology suggests possible papillary muscle origin VT, s/p ablation on 03/02/2018. Patient discharged on 03/03/2018, and was to follow up with Dr. Lucho Thompson, Cardiac Electrophysiology, on 03/23/2018 and started on metoprolol tartrate 25mg bid. Cardiac MRI showed Normal LV, RV size/function and Cardiac cath showed no irregularities.     Pharmacy: [facility], [address], [place], [place] 47 year old female with PMH of HTN, HLD and DM T2-not on meds presented to ED with CC of syncope. Patient was discharged from [facility] two days ago, had been admitted d/t acute onset of palpitations with EKG evidence of SR with intermittent frequent runs of sustained and non-sustained VT up to 15-20 seconds,  VT morphology suggests possible papillary muscle origin VT, s/p ablation on 03/02/2018. Patient discharged on 03/03/2018, and was to follow up with Dr. Lucho Thompson, Cardiac Electrophysiology, on 03/23/2018 and started on metoprolol tartrate 25mg bid. Cardiac MRI showed Normal LV, RV size/function and Cardiac cath showed no irregularities. Patient presenting today d/t syncopal episode while on toilet this morning. Patient was going to bathroom, when suddenly passed out. Woke up without any kind of confusion or disorientation and was still on the toilet. Did not fall down or hit head. Did not bite tongue. Endorses felling lightheaded in ER, but denies palpitations, shortness of breath, chest pain, n/v/d/c, urinary symptoms, diaphoresis, numbness, tingling.     Pharmacy: [facility], [address] [place], [place]

## 2018-03-06 NOTE — ED ADULT NURSE NOTE - OBJECTIVE STATEMENT
Received patient to room 2 for c/o dizziness. Pt evaluated by MD. IVL placed to left hand 20 gauge and labs drawn and sent. Awaiting results and disposition.   GUERA Ratliff

## 2018-03-22 PROBLEM — Z86.39 HISTORY OF TYPE 2 DIABETES MELLITUS: Status: RESOLVED | Noted: 2018-03-22 | Resolved: 2018-03-22

## 2018-03-22 PROBLEM — I10 BENIGN ESSENTIAL HTN: Status: ACTIVE | Noted: 2018-03-22

## 2018-03-23 ENCOUNTER — APPOINTMENT (OUTPATIENT)
Dept: ELECTROPHYSIOLOGY | Facility: CLINIC | Age: 48
End: 2018-03-23
Payer: MEDICAID

## 2018-03-23 ENCOUNTER — NON-APPOINTMENT (OUTPATIENT)
Age: 48
End: 2018-03-23

## 2018-03-23 VITALS
RESPIRATION RATE: 14 BRPM | WEIGHT: 179 LBS | BODY MASS INDEX: 30.19 KG/M2 | OXYGEN SATURATION: 100 % | HEART RATE: 83 BPM | SYSTOLIC BLOOD PRESSURE: 116 MMHG | HEIGHT: 64.5 IN | DIASTOLIC BLOOD PRESSURE: 82 MMHG

## 2018-03-23 DIAGNOSIS — Z98.890 OTHER SPECIFIED POSTPROCEDURAL STATES: ICD-10-CM

## 2018-03-23 DIAGNOSIS — Z86.79 OTHER SPECIFIED POSTPROCEDURAL STATES: ICD-10-CM

## 2018-03-23 DIAGNOSIS — R42 DIZZINESS AND GIDDINESS: ICD-10-CM

## 2018-03-23 DIAGNOSIS — I10 ESSENTIAL (PRIMARY) HYPERTENSION: ICD-10-CM

## 2018-03-23 DIAGNOSIS — Z86.018 PERSONAL HISTORY OF OTHER BENIGN NEOPLASM: ICD-10-CM

## 2018-03-23 DIAGNOSIS — I47.2 VENTRICULAR TACHYCARDIA: ICD-10-CM

## 2018-03-23 DIAGNOSIS — R00.2 PALPITATIONS: ICD-10-CM

## 2018-03-23 DIAGNOSIS — Z86.39 PERSONAL HISTORY OF OTHER ENDOCRINE, NUTRITIONAL AND METABOLIC DISEASE: ICD-10-CM

## 2018-03-23 DIAGNOSIS — D64.9 ANEMIA, UNSPECIFIED: ICD-10-CM

## 2018-03-23 PROCEDURE — 93000 ELECTROCARDIOGRAM COMPLETE: CPT

## 2018-03-23 PROCEDURE — 99214 OFFICE O/P EST MOD 30 MIN: CPT

## 2018-03-23 RX ORDER — METOPROLOL TARTRATE 25 MG/1
25 TABLET, FILM COATED ORAL
Qty: 60 | Refills: 0 | Status: DISCONTINUED | COMMUNITY
Start: 2018-03-03 | End: 2018-03-23

## 2018-03-23 RX ORDER — FERROUS SULFATE 325(65) MG
325 (65 FE) TABLET ORAL DAILY
Qty: 90 | Refills: 1 | Status: ACTIVE | COMMUNITY
Start: 2018-03-23 | End: 1900-01-01

## 2023-08-22 NOTE — DISCHARGE NOTE ADULT - NS DC INTERPRETER YES NO
excoriation. No TTP, vesicles, pustules, induration, or fluctuance. No bleeding or discharge noted. No lymphangitic streaking. Musculoskeletal: General: Normal strength / ROM. Neurological:  Orientation age-appropriate unless noted elsewhere. Motor functions intact. Psychiatric: Mood and Affect: Mood normal. Behavior: Behavior normal    Testing:   (All laboratory and radiology results have been personally reviewed by myself)  Labs:  No results found for this visit on 08/22/23. Imaging: All Radiology results interpreted by Radiologist unless otherwise noted. No orders to display       Assessment / Plan:   The patient's vitals, allergies, medications, and past medical history have been reviewed. Alejandrina Rizo was seen today for rash. Diagnoses and all orders for this visit:    Rash and nonspecific skin eruption  -     loratadine (CLARITIN) 10 MG tablet; Take 1 tablet by mouth daily  -     methylPREDNISolone (MEDROL DOSEPACK) 4 MG tablet; Take by mouth. -     triamcinolone acetonide (KENALOG-40) injection 40 mg        - Disposition: Home    - Educational material printed for patient's review and were included in patient instructions. After Visit Summary was given to patient at the end of visit. - Discussed symptomatic treatments with the patient today. The patient is to schedule a follow-up with PCP in the next 2-3 days for reevaluation. Red flag symptoms were also discussed with the patient today. If symptoms worsen the patient is to go directly to the emergency department for reevaluation and treatment. Pt verbalizes understanding and is in agreement with plan of care. All questions answered. SIGNATURE: Geoffrey Phoenix, APRN - CNP      *NOTE: This report was transcribed using voice recognition software. Every effort was made to ensure accuracy; however, inadvertent computerized transcription errors may be present.
No

## 2024-12-30 NOTE — ED PROVIDER NOTE - NS_EDPROVIDERDISPOUSERTYPE_ED_A_ED
Patient Call Back:    Patient states that she has not been checking her blood sugar since she broke her glucometer.   States her BG readings were the following prior     246, 349,[phone] (2-3 weeks ago)    Reviewed the following plan of care with the patient:    Lantus- 13 units in the morning   Novolog 5 units plus sliding scale (Hold if not eating or if blood sugar under 100)  Novolo - 200 + 2 unit    201 - 250 + 4 units    251 - 300 + 6 units    301 - 350 + 8 units       > 350   + 10 units    Will send in a glucometer for the patient- [facility] on [address] in [place], [place].  Will get follow-up appointment with DM education and D/C clinic visit for close follow-up with Diane MoralesD. Patient diagnosed with COVID, stated I would let her coordinator know. Patient verbalized understanding.       Quincy Lyles DNP, FNP-C  Department of Endocrinology  Inpatient Glycemic Management         
Attending Attestation (For Attendings USE Only)...